# Patient Record
Sex: MALE | Race: WHITE | NOT HISPANIC OR LATINO | Employment: FULL TIME | URBAN - METROPOLITAN AREA
[De-identification: names, ages, dates, MRNs, and addresses within clinical notes are randomized per-mention and may not be internally consistent; named-entity substitution may affect disease eponyms.]

---

## 2017-04-20 ENCOUNTER — HOSPITAL ENCOUNTER (EMERGENCY)
Facility: CLINIC | Age: 52
Discharge: HOME | End: 2017-04-20
Attending: EMERGENCY MEDICINE | Admitting: EMERGENCY MEDICINE
Payer: COMMERCIAL

## 2017-04-20 VITALS
HEIGHT: 70 IN | OXYGEN SATURATION: 96 % | DIASTOLIC BLOOD PRESSURE: 98 MMHG | HEART RATE: 90 BPM | RESPIRATION RATE: 18 BRPM | SYSTOLIC BLOOD PRESSURE: 166 MMHG | BODY MASS INDEX: 26.48 KG/M2 | WEIGHT: 185 LBS | TEMPERATURE: 98.6 F

## 2017-04-20 DIAGNOSIS — J03.90 ACUTE TONSILLITIS, UNSPECIFIED ETIOLOGY: ICD-10-CM

## 2017-04-20 DIAGNOSIS — I10 BENIGN ESSENTIAL HYPERTENSION: ICD-10-CM

## 2017-04-20 LAB
DEPRECATED S PYO AG THROAT QL EIA: NORMAL
MICRO REPORT STATUS: NORMAL
SPECIMEN SOURCE: NORMAL

## 2017-04-20 PROCEDURE — 25000132 ZZH RX MED GY IP 250 OP 250 PS 637: Performed by: EMERGENCY MEDICINE

## 2017-04-20 PROCEDURE — 99283 EMERGENCY DEPT VISIT LOW MDM: CPT

## 2017-04-20 PROCEDURE — 87081 CULTURE SCREEN ONLY: CPT | Performed by: EMERGENCY MEDICINE

## 2017-04-20 PROCEDURE — 87880 STREP A ASSAY W/OPTIC: CPT | Performed by: EMERGENCY MEDICINE

## 2017-04-20 RX ORDER — CLINDAMYCIN HCL 300 MG
300 CAPSULE ORAL 4 TIMES DAILY
Qty: 28 CAPSULE | Refills: 0 | Status: SHIPPED | OUTPATIENT
Start: 2017-04-20 | End: 2017-04-27

## 2017-04-20 RX ORDER — ACETAMINOPHEN 325 MG/1
650 TABLET ORAL ONCE
Status: COMPLETED | OUTPATIENT
Start: 2017-04-20 | End: 2017-04-20

## 2017-04-20 RX ORDER — HYDROCODONE BITARTRATE AND ACETAMINOPHEN 5; 325 MG/1; MG/1
1-2 TABLET ORAL EVERY 4 HOURS PRN
Qty: 10 TABLET | Refills: 0 | Status: SHIPPED | OUTPATIENT
Start: 2017-04-20 | End: 2017-04-30

## 2017-04-20 RX ADMIN — ACETAMINOPHEN 650 MG: 325 TABLET, FILM COATED ORAL at 02:02

## 2017-04-20 NOTE — DISCHARGE INSTRUCTIONS
Take the Vicodin/Norco or Tylenol/Acetaminophen, but do not take both at the same time, because they both contain Acetaminophen.  You cannot take more than 4000 mg of Acetaminophen/Tylenol over a 24 hour period of time.    Drink plenty of fluids.    Opioid Medication Information    You have been given a prescription for an opioid (narcotic) pain medicine and/or have received a pain medicine while here in the Emergency Department. These medicines can make you drowsy or impaired. You must not drive, operate dangerous equipment, or engage in any other dangerous activities while taking these medications. If you drive while taking these medications, you could be arrested for DUI, or driving under the influence. Do not drink any alcohol while you are taking these medications.   Opioid pain medications can cause addiction. If you have a history of chemical dependency of any type, you are at a higher risk of becoming addicted to pain medications.  Only take these prescribed medications to treat your pain when all other options have been tried. Take it for as short a time and as few doses as possible. Store your pain pills in a secure place, as they are frequently stolen and provide a dangerous opportunity for children or visitors in your house to start abusing these powerful medications. We will not replace any lost or stolen medicine.  As soon as your pain is better, you should flush all your remaining medication.   Many prescription pain medications contain Tylenol  (acetaminophen), including Vicodin , Tylenol #3 , Norco , Lortab , and Percocet .  You should not take any extra pills of Tylenol  if you are using these prescription medications or you can get very sick.  Do not ever take more than 4000 mg of acetaminophen in any 24 hour period.  All opioids tend to cause constipation. Drink plenty of water and eat foods that have a lot of fiber, such as fruits, vegetables, prune juice, apple juice and high fiber cereal.   Take a laxative if you don t move your bowels at least every other day. Miralax , Milk of Magnesia, Colace , or Senna  can be used to keep you regular.

## 2017-04-20 NOTE — ED PROVIDER NOTES
CHIEF COMPLAINT:  Saran Agudelo is a 51-year-old male who arrives with a chief complaint of sore throat.      HISTORY OF PRESENT ILLNESS:  The patient reports that 5 days ago he started with nasal congestion and cough and feeling feverish.  The cough and fever resolved 3 days ago; however, 3 days ago he also began with a sore throat.  The sore throat seemed improved yesterday but then worsened over the past 24 hours and he states that the sore throat is worse than it was when it started 3 days ago.  He has painful swallowing, but he is able to swallow.  He is able to speak normally.  He has no headache, neck pain or stiffness.  No chest pain or difficulty breathing.  He has no known ill exposures.  He denies chest pain, difficulty breathing or headache or vomiting or vision changes.      PAST MEDICAL HISTORY:  DVT and PE, chronic kidney disease, hypertension, hyperlipidemia.      MEDICATIONS:  He takes aspirin 81 mg per day.  He was previously on antihypertensive medication but stopped taking it because he does not want to take any antihypertensives.      ALLERGIES:  Penicillin, droperidol, Compazine and bees.      REVIEW OF SYSTEMS:  See history of present illness.  Remainder of review of systems are negative.      SOCIAL HISTORY:  He smokes cigarettes on a daily basis.  He drinks occasional alcohol and denies drug use.  He works at the [employer].      PHYSICAL EXAMINATION:   VITAL SIGNS:  Blood pressure 169/95, pulse 90, respiratory rate 16, temperature 98.6 orally, SaO2 98% on room air.   GENERAL:  The patient is awake and alert.  He appears comfortable, in no acute distress.  He is swallowing and speaking normally.   HEENT:  PERRLA, nonicteric.  TMs clear.  Orals:  Erythema to the posterior oropharynx with a small amount of tonsillar exudate on the left tonsil.  There is no unilateral swelling and no peritonsillar fullness or swelling.     NECK:  Supple, nontender, no lymphadenopathy, no meningeal signs.  He  can touch his chin to his chest comfortably.   CHEST:  Clear to auscultation, equal.   CARDIOVASCULAR:  Regular rate and rhythm without murmur, rubs or gallops.   ABDOMEN:  Soft, round, nontender.   NEUROLOGIC:  Cranial nerves II-XII intact.      LABS AND DIAGNOSTIC TESTING:  Rapid Strep was performed and was negative.      EMERGENCY DEPARTMENT COURSE AND TREATMENT:  He was given Tylenol 650 mg p.o. with improvement of his pain.         IMPRESSION/MEDICAL DECISION MAKING:  I found the patient to have tonsillitis and I was concerned for strep tonsillitis and so he was discharged with clindamycin 300 mg 4 times daily for 7 days and a prescription for Vicodin and told not to drive a car or drink alcohol for 6 hours after taking the Vicodin.  He was told not to use ibuprofen because of his chronic kidney disease and to either use Tylenol or Vicodin but not both at the same time because they both have Tylenol in them.  He was given a work note for 2 days off of work and instructed to drink plenty of fluids and follow up in clinic in two days if his sore throat is not improved.  He was also told to follow up within the next week with his primary care doctor to recheck his blood pressure and get started on an antihypertensive.  I discussed with him the risks of worsening his kidney disease if he leaves his high blood pressure and treated and I discussed with him his hypertension here.  He understands and agrees.  He was not having any stroke or cardiac symptoms and I felt he could be safely discharged.      FINAL DIAGNOSIS:     1.  Acute tonsillitis, unspecified etiology.   2.  Benign essential hypertension.         AHMET SANTIAGO MD             D: 2017 06:02   T: 2017 07:46   MT:       Name:     ELLY FRANK   MRN:      [id number]        Account:      [id number]   :      1965           Visit Date:   2017      Document: [id number]

## 2017-04-20 NOTE — ED AVS SNAPSHOT
Emergency Department    [address] [place] [address]    Phone:  [phone]    Fax:  [phone]                                       Saran Agudelo   MRN: [id number]    Department:   Emergency Department   Date of Visit:  4/20/2017           After Visit Summary Signature Page     I have received my discharge instructions, and my questions have been answered. I have discussed any challenges I see with this plan with the nurse or doctor.    ..........................................................................................................................................  Patient/Patient Representative Signature      ..........................................................................................................................................  Patient Representative Print Name and Relationship to Patient    ..................................................               ................................................  Date                                            Time    ..........................................................................................................................................  Reviewed by Signature/Title    ...................................................              ..............................................  Date                                                            Time

## 2017-04-20 NOTE — LETTER
EMERGENCY DEPARTMENT  [address] [facility] [address]  [phone]    2017    Saran Agudelo  [address]   [place] [address]  [phone] (home) [phone] (work)    : 1965      To Whom it may concern:    Saran Agudelo was seen in our Emergency Department today, 2017.  I expect his condition to improve over the next 2 days.  He may return to work/school when improved.  Please excuse him from work today (and tomorrow if he requires it).    Sincerely,        Dolores Lopez

## 2017-04-20 NOTE — ED NOTES
"The Pt presents to the ED with c/o \"cold symptoms\" over the last five days. He reports that on 4/17/17 he states that his sore throat began painful lately. He denies  SOB or abdominal pain.  "

## 2017-04-20 NOTE — ED AVS SNAPSHOT
Emergency Department    [address] [facility] [address]    Phone:  [phone]    Fax:  [phone]                                       Saran Agudelo   MRN: [id number]    Department:   Emergency Department   Date of Visit:  4/20/2017           Patient Information     Date Of Birth          1965        Your diagnoses for this visit were:     Acute tonsillitis, unspecified etiology     Benign essential hypertension        You were seen by Dolores Lopez MD.      Follow-up Information     Follow up with Dandre Gaitan MD.    Specialty:  Internal Medicine    Why:  for recheck of your blood pressure and follow-up within the next week    Contact information:    [facility]  [address] [facility] [address] [phone]          Follow up with  Emergency Department.    Specialty:  EMERGENCY MEDICINE    Why:  As needed, If symptoms worsen    Contact information:    [address] [facility] [address] [phone]        Discharge Instructions       Take the Vicodin/Norco or Tylenol/Acetaminophen, but do not take both at the same time, because they both contain Acetaminophen.  You cannot take more than 4000 mg of Acetaminophen/Tylenol over a 24 hour period of time.    Drink plenty of fluids.    Opioid Medication Information    You have been given a prescription for an opioid (narcotic) pain medicine and/or have received a pain medicine while here in the Emergency Department. These medicines can make you drowsy or impaired. You must not drive, operate dangerous equipment, or engage in any other dangerous activities while taking these medications. If you drive while taking these medications, you could be arrested for DUI, or driving under the influence. Do not drink any alcohol while you are taking these medications.   Opioid pain medications can cause addiction. If you have a history of chemical dependency of any type, you are at a higher risk  of becoming addicted to pain medications.  Only take these prescribed medications to treat your pain when all other options have been tried. Take it for as short a time and as few doses as possible. Store your pain pills in a secure place, as they are frequently stolen and provide a dangerous opportunity for children or visitors in your house to start abusing these powerful medications. We will not replace any lost or stolen medicine.  As soon as your pain is better, you should flush all your remaining medication.   Many prescription pain medications contain Tylenol  (acetaminophen), including Vicodin , Tylenol #3 , Norco , Lortab , and Percocet .  You should not take any extra pills of Tylenol  if you are using these prescription medications or you can get very sick.  Do not ever take more than 4000 mg of acetaminophen in any 24 hour period.  All opioids tend to cause constipation. Drink plenty of water and eat foods that have a lot of fiber, such as fruits, vegetables, prune juice, apple juice and high fiber cereal.  Take a laxative if you don t move your bowels at least every other day. Miralax , Milk of Magnesia, Colace , or Senna  can be used to keep you regular.            Discharge References/Attachments     HIGH BLOOD PRESSURE, ESTABLISHED, OUT OF CONTROL (ENGLISH)    PHARYNGITIS, STREP (PRESUMED) (ENGLISH)      24 Hour Appointment Hotline       To make an appointment at any [facility] clinic, call [phone] ([phone]). If you don't have a family doctor or clinic, we will help you find one. [facility] clinics are conveniently located to serve the needs of you and your family.             Review of your medicines      START taking        Dose / Directions Last dose taken    clindamycin 300 MG capsule   Commonly known as:  CLEOCIN   Dose:  300 mg   Quantity:  28 capsule        Take 1 capsule (300 mg) by mouth 4 times daily for 7 days   Refills:  0        HYDROcodone-acetaminophen 5-325 MG per tablet    Commonly known as:  NORCO   Dose:  1-2 tablet   Quantity:  10 tablet        Take 1-2 tablets by mouth every 4 hours as needed for moderate to severe pain No driving a car or drinking alcohol for 6 hours after taking this medication.   Refills:  0          Our records show that you are taking the medicines listed below. If these are incorrect, please call your family doctor or clinic.        Dose / Directions Last dose taken    aspirin 81 MG chewable tablet   Dose:  81 mg   Quantity:  108 tablet        Take 1 tablet (81 mg) by mouth daily   Refills:  3                Prescriptions were sent or printed at these locations (2 Prescriptions)                   Other Prescriptions                Printed at Department/Unit printer (2 of 2)         clindamycin (CLEOCIN) 300 MG capsule               HYDROcodone-acetaminophen (NORCO) 5-325 MG per tablet                Procedures and tests performed during your visit     Beta strep group A culture    Rapid strep screen      Orders Needing Specimen Collection     None      Pending Results     Date and Time Order Name Status Description    4/20/2017 0153 Beta strep group A culture In process             Pending Culture Results     Date and Time Order Name Status Description    4/20/2017 0153 Beta strep group A culture In process             Test Results From Your Hospital Stay        4/20/2017  2:06 AM      Component Results     Component    Specimen Description    Throat    Rapid Strep A Screen    NEGATIVE: No Group A streptococcal antigen detected by immunoassay, await   culture report.      Micro Report Status    FINAL 04/20/2017 4/20/2017  2:07 AM                Clinical Quality Measure: Blood Pressure Screening     Your blood pressure was checked while you were in the emergency department today. The last reading we obtained was  BP: (!) 154/102 . Please read the guidelines below about what these numbers mean and what you should do about them.  If your systolic  "blood pressure (the top number) is less than 120 and your diastolic blood pressure (the bottom number) is less than 80, then your blood pressure is normal. There is nothing more that you need to do about it.  If your systolic blood pressure (the top number) is 120-139 or your diastolic blood pressure (the bottom number) is 80-89, your blood pressure may be higher than it should be. You should have your blood pressure rechecked within a year by a primary care provider.  If your systolic blood pressure (the top number) is 140 or greater or your diastolic blood pressure (the bottom number) is 90 or greater, you may have high blood pressure. High blood pressure is treatable, but if left untreated over time it can put you at risk for heart attack, stroke, or kidney failure. You should have your blood pressure rechecked by a primary care provider within the next 4 weeks.  If your provider in the emergency department today gave you specific instructions to follow-up with your doctor or provider even sooner than that, you should follow that instruction and not wait for up to 4 weeks for your follow-up visit.        Thank you for choosing [facility]       Thank you for choosing [facility] for your care. Our goal is always to provide you with excellent care. Hearing back from our patients is one way we can continue to improve our services. Please take a few minutes to complete the written survey that you may receive in the mail after you visit with us. Thank you!        [employer] Information     [employer] lets you send messages to your doctor, view your test results, renew your prescriptions, schedule appointments and more. To sign up, go to [email or web address] . Click on \"Log in\" on the left side of the screen, which will take you to the Welcome page. Then click on \"Sign up Now\" on the right side of the page.     You will be asked to enter the access code listed below, as well as some personal information. Please follow the " directions to create your username and password.     Your access code is: [email or web address]  Expires: 2017  2:20 AM     Your access code will  in 90 days. If you need help or a new code, please call your [facility] clinic or [phone].        Care EveryWhere ID     This is your Care EveryWhere ID. This could be used by other organizations to access your [facility] medical records  [id number]        After Visit Summary       This is your record. Keep this with you and show to your community pharmacist(s) and doctor(s) at your next visit.

## 2017-04-22 LAB
BACTERIA SPEC CULT: NORMAL
MICRO REPORT STATUS: NORMAL
SPECIMEN SOURCE: NORMAL

## 2017-05-13 ENCOUNTER — TELEPHONE (OUTPATIENT)
Dept: NURSING | Facility: CLINIC | Age: 52
End: 2017-05-13

## 2017-05-13 NOTE — TELEPHONE ENCOUNTER
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  It bled and  he washed it but is wondering when was hepatitis shot last given?  Advised tetanus is up to date but screening for hepatitus and  immunization for hepatitis would need an appointment. Advised him to  contact workman's comp with the airport clinic to notify them of  injury. Connected with scheduling for an appointment for testing.  Triage Note:  Guideline Title: No Guideline - Advice Per Reference (Adult)  Recommended Disposition: Call Local Agency within 24 Hours  Original Inclination: Did not know what to do  Override Disposition:  Intended Action: Follow advice given  Physician Contacted: No  CALL LOCAL AGENCY WITHIN 24 HOURS ?  YES  SEE ED IMMEDIATELY ? NO  CALL POISON CENTER IMMEDIATELY ? NO  CALL LOCAL AGENCY IMMEDIATELY ? NO  SEE DENTIST WITHIN 4 HOURS ? NO  SEE DENTIST WITHIN 24 HOURS ? NO  ACTIVATE  ? NO  SEE PROVIDER WITHIN 4 HOURS ? NO  SEE PROVIDER WITHIN 24 HOURS ? NO  CALL PROVIDER WITHIN 24 HOURS ? NO  CALL PROVIDER IMMEDIATELY ? NO  Physician Instructions:  Care Advice:  "

## 2017-05-15 ENCOUNTER — TELEPHONE (OUTPATIENT)
Dept: INTERNAL MEDICINE | Facility: CLINIC | Age: 52
End: 2017-05-15

## 2017-05-15 NOTE — TELEPHONE ENCOUNTER
Reason for Call: Request for an order or referral:    Order or referral being requested: Lab Test (Hepatitis)    Date needed: as soon as possible    Has the patient been seen by the PCP for this problem? unknown    Additional comments: the patient had cut himself at work on 05.13.17 and would like to get a Hepatitis check.  He was working on the human waste lines, pulling on the lines, and cut himself.      Phone number Patient can be reached at:  Home number on file [phone] (home)    Best Time:  anytime    Can we leave a detailed message on this number?  YES    Call taken on 5/15/2017 at 6:07 PM by Barbra Perrin

## 2017-05-16 NOTE — TELEPHONE ENCOUNTER
Pt states this is a work comp issue. Given number to schedule at occupational health clinic.  Cristy Lyons, SERGIO

## 2017-12-04 ENCOUNTER — TELEPHONE (OUTPATIENT)
Dept: ONCOLOGY | Facility: CLINIC | Age: 52
End: 2017-12-04

## 2017-12-04 NOTE — TELEPHONE ENCOUNTER
I received a message from patient concerned about a work injury that was sustained on his blood clot leg.   Patient has an appointment scheduled with Dr. Fox tomorrow 12/5. Bharti Ndiaye    I spoke with patient and he fell back on his leg and sustained a golf ball sized knot on the back of his leg that had previously had a clot. He has iced it some tthe area is tender but not bruised. He is only on an 81 mg of ASA. I informed him that we would see him tomorrow at his scheduled appointment. Bharti Ndiaye

## 2017-12-05 ENCOUNTER — HOSPITAL ENCOUNTER (OUTPATIENT)
Dept: ULTRASOUND IMAGING | Facility: CLINIC | Age: 52
Discharge: HOME | End: 2017-12-05
Attending: INTERNAL MEDICINE | Admitting: INTERNAL MEDICINE
Payer: OTHER MISCELLANEOUS

## 2017-12-05 ENCOUNTER — ONCOLOGY VISIT (OUTPATIENT)
Dept: ONCOLOGY | Facility: CLINIC | Age: 52
End: 2017-12-05
Attending: INTERNAL MEDICINE
Payer: OTHER MISCELLANEOUS

## 2017-12-05 VITALS
DIASTOLIC BLOOD PRESSURE: 102 MMHG | SYSTOLIC BLOOD PRESSURE: 160 MMHG | BODY MASS INDEX: 28.41 KG/M2 | WEIGHT: 198 LBS | HEART RATE: 83 BPM | RESPIRATION RATE: 22 BRPM | TEMPERATURE: 98.8 F

## 2017-12-05 DIAGNOSIS — M79.604 PAIN OF RIGHT LOWER EXTREMITY: Primary | ICD-10-CM

## 2017-12-05 PROCEDURE — 99213 OFFICE O/P EST LOW 20 MIN: CPT | Performed by: INTERNAL MEDICINE

## 2017-12-05 PROCEDURE — 99211 OFF/OP EST MAY X REQ PHY/QHP: CPT

## 2017-12-05 PROCEDURE — 93971 EXTREMITY STUDY: CPT | Mod: RT

## 2017-12-05 ASSESSMENT — PAIN SCALES - GENERAL: PAINLEVEL: NO PAIN (0)

## 2017-12-05 NOTE — MR AVS SNAPSHOT
"              After Visit Summary   2017    Saran Agudelo    MRN: [id number]           Patient Information     Date Of Birth          1965        Visit Information        Provider Department      2017 10:30 AM Syeda Fox MD [facility]        Today's Diagnoses     Pain of right lower extremity    -  1      Care Instructions    Right leg US today.  Follow up as needed.          Follow-ups after your visit        Who to contact     If you have questions or need follow up information about today's clinic visit or your schedule please contact [facility] directly at [phone].  Normal or non-critical lab and imaging results will be communicated to you by MailTimehart, letter or phone within 4 business days after the clinic has received the results. If you do not hear from us within 7 days, please contact the clinic through MailTimehart or phone. If you have a critical or abnormal lab result, we will notify you by phone as soon as possible.  Submit refill requests through [employer] or call your pharmacy and they will forward the refill request to us. Please allow 3 business days for your refill to be completed.          Additional Information About Your Visit        MyChart Information     [employer] lets you send messages to your doctor, view your test results, renew your prescriptions, schedule appointments and more. To sign up, go to [email or web address] . Click on \"Log in\" on the left side of the screen, which will take you to the Welcome page. Then click on \"Sign up Now\" on the right side of the page.     You will be asked to enter the access code listed below, as well as some personal information. Please follow the directions to create your username and password.     Your access code is: [email or web address]  Expires: 3/11/2018  8:55 AM     Your access code will  in 90 days. If you need help or a new code, please call your [facility] clinic or [phone].        Care " EveryWhere ID     This is your Care EveryWhere ID. This could be used by other organizations to access your [facility] medical records  [id number]        Your Vitals Were     Pulse Temperature Respirations BMI (Body Mass Index)          83 98.8  F (37.1  C) 22 28.41 kg/m2         Blood Pressure from Last 3 Encounters:   12/05/17 (!) 160/102   04/20/17 (!) 166/98   04/06/16 128/72    Weight from Last 3 Encounters:   12/05/17 89.8 kg (198 lb)   04/20/17 83.9 kg (185 lb)   04/06/16 83.9 kg (185 lb)              We Performed the Following     US Lower Extremity Venous Duplex Right        Primary Care Provider Office Phone # Fax #    Dandre Gaitan [phone]       [address] [facility] [address]        Equal Access to Services     [facility]: Hadii aad ku hadasho Soomaali, waaxda luqadaha, qaybta kaalmada adeegyada, ren miguel hayinocente hoskins . So [facility] [phone].    ATENCIÓN: Si habla español, tiene a zuniga disposición servicios gratuitos de asistencia lingüística. Lan al [phone].    We comply with applicable federal civil rights laws and [place] laws. We do not discriminate on the basis of race, color, national origin, age, disability, sex, sexual orientation, or gender identity.            Thank you!     Thank you for choosing [facility]  for your care. Our goal is always to provide you with excellent care. Hearing back from our patients is one way we can continue to improve our services. Please take a few minutes to complete the written survey that you may receive in the mail after your visit with us. Thank you!             Your Updated Medication List - Protect others around you: Learn how to safely use, store and throw away your medicines at www.disposemymeds.org.          This list is accurate as of: 12/5/17 11:59 PM.  Always use your most recent med list.                   Brand Name Dispense Instructions for use Diagnosis    aspirin 81 MG  chewable tablet     108 tablet    Take 1 tablet (81 mg) by mouth daily

## 2017-12-05 NOTE — LETTER
"    12/5/2017         RE: Saran Agudelo  [address]   [facility] [address]        Dear Colleague,    Thank you for referring your patient, Saran Agudelo, to the [facility]. Please see a copy of my visit note below.    Oncology Rooming Note    December 5, 2017 10:18 AM   Saran Agudelo is a 52 year old male who presents for:    Chief Complaint   Patient presents with     Oncology Clinic Visit     Return-leg injury      Initial Vitals: BP (!) 160/102 (BP Location: Right arm, Patient Position: Chair, Cuff Size: Adult Large)  Pulse 83  Temp 98.8  F (37.1  C)  Resp 22  Wt 89.8 kg (198 lb)  BMI 28.41 kg/m2 Estimated body mass index is 28.41 kg/(m^2) as calculated from the following:    Height as of 4/20/17: 1.778 m (5' 10\").    Weight as of this encounter: 89.8 kg (198 lb). Body surface area is 2.11 meters squared.  No Pain (0) Comment: Data Unavailable   No LMP for male patient.  Allergies reviewed: Yes  Medications reviewed: Yes    Medications: Pt dose not need refill on medication  Pharmacy name entered into [facility]: [facility], [place] - [address]    Clinical concerns: none     5 minutes for nursing intake (face to face time)     Shonna Mchugh CMA        Right leg US today.  Follow up as needed.        HEMATOLOGIC HISTORY: Mr. Saran Agudelo is a gentleman with deep vein thrombosis and pulmonary embolism.  1. Right lower extremity ultrasound was on 04/03/2014 revealed an occlusive DVT involving the mid to distal femoral vein extending into popliteal and tibioperoneal trunk. DVT was unprovoked. Started on treatment with enoxaparin and warfarin.   2. Because of hemoptysis, a CT chest was done on 04/05/2014. It revealed pulmonary embolism involving the right upper lobe, right middle lobe, right lower lobe and left upper lobe. There was suspicion of a small pulmonary infarct in the right middle lobe.   3. Hypercoagulable workup was done on " 04/05/2014.   -Lupus anticoagulant negative.   -Anticardiolipin IgG and IgM negative.   -Factor II negative.   -Factor V Leiden negative.   4. CT abdomen and pelvis on 05/05/2014 did not reveal any evidence of malignancy. There was question of a 0.7 cm filling defect in terminal ileum.   5. Colonoscopy on 01/06/2015 revealed a sigmoid colon polyp which was tubular adenoma.  Repeat colonoscopy in 5 years has been recommended.   6. CT chest angiogram on 06/08/2015 did not reveal any PE.  7. Anticoagulation was stopped in 06/2015. (Patient did not want long term anti-coagulation).    SUBJECTIVE:    Mr. Saran Agudelo is a 52-year-old gentleman with history of unprovoked right lower extremity DVT and bilateral pulmonary embolism in 04/2014.  Patient received anticoagulation between 04/2014 and 06/2015.  He was recommended indefinite anticoagulation, but he did not want it. Anticoagulation was stopped in 06/2015.  He is currently on aspirin.  He has done well.  There has been no recurrence of blood clot.      Patient presents to the clinic because of pain and swelling in the right leg which started after an injury.  On 11/30/2017, he fell down while at work.  Patient injured his right leg.  After the fall, he started to have pain and swelling. Pain and swelling is mainly in the right calf area.  He has continued to work.      REVIEW OF SYSTEMS:  Denies any headache.  No dizziness.  No neck pain.  No chest pain or difficulty breathing.  No abdominal pain, nausea or vomiting.  No urinary or bowel complaints.  No bleeding.  No fever, chills, night sweats.      PHYSICAL EXAMINATION:   GENERAL:  He is alert and oriented x3.   VITAL SIGNS:  Reviewed.   EYES:  No icterus.   THROAT:  No ulcer or thrush.   NECK:  Supple, no lymphadenopathy or thyromegaly.   AXILLAE:  No lymphadenopathy.   LUNGS:  Good air entry bilaterally.  No crackles or wheezing.   HEART:  Regular.   ABDOMEN:  Soft and nontender.  No mass.   EXTREMITIES:      -Right lower extremity does not reveal any edema.  There is some calff swelling and tenderness.  No redness.  Skin is normal.  No breakdown of the skin.  Right thigh is normal.  No swelling, redness or tenderness.  Right knee is normal.   -Left lower extremity is normal.  No swelling, redness or tenderness.   SKIN:  No petechia.      ASSESSMENT:   1.  Pain and swelling in right calf.  This is secondary to injury on 11/30/2017.   2.  History of unprovoked right lower extremity DVT and bilateral pulmonary embolism in 04/2014.      PLAN:   1.  Discussed with him regarding right calf pain and swelling.  This started after injury at work.  I told him this is secondary to trauma.  Given his history of thrombosis, we need to rule out DVT.  I will get an ultrasound done.  He is agreeable for it.   2.  For pain and swelling, he will continue taking over-the-counter pain medication as needed.  Advised him to limit standing at work as tolerated.     3.  Advised the patient to go to the emergency room if he has chest pain, shortness of breath, worsening pain/swelling/redness in the extremities.      ADDENDUM:    Ultrasound of the right lower extremity does not reveal any acute DVT.  There is a small amount of nonocclusive chronic thrombus in the right popliteal vein.  It has improved compared to 04/2014.      I called and spoke to the patient. Informed him of the results of ultrasound.  I told him there is no acute DVT.  Previous blood clot has improved.  There is residual nonocclusive thrombosis.  I told the patient that pain and swelling is secondary to injury.  I advised him to take over-the-counter pain medication. Patient's work involves a lot of standing.  I told him to limit the amount of standing as tolerated.  He will continue on aspirin.  I advised the patient to return to clinic if his pain and swelling does not improve in the next 2-3 weeks or if it gets worse or if he has any other new problems.  Patient will  have his blood pressure rechecked.  It is high in the clinic because of pain.         PERLITA MENDOZA MD             D: 2017 15:17   T: 2017 06:30   MT: SK      Name:     ELLY FRANK   MRN:      [id number]        Account:      [id number]   :      1965           Visit Date:   2017      Document: [id number]        Again, thank you for allowing me to participate in the care of your patient.        Sincerely,        Perlita Mendoza MD

## 2017-12-05 NOTE — PROGRESS NOTES
"Oncology Rooming Note    December 5, 2017 10:18 AM   Saran Agudelo is a 52 year old male who presents for:    Chief Complaint   Patient presents with     Oncology Clinic Visit     Return-leg injury      Initial Vitals: BP (!) 160/102 (BP Location: Right arm, Patient Position: Chair, Cuff Size: Adult Large)  Pulse 83  Temp 98.8  F (37.1  C)  Resp 22  Wt 89.8 kg (198 lb)  BMI 28.41 kg/m2 Estimated body mass index is 28.41 kg/(m^2) as calculated from the following:    Height as of 4/20/17: 1.778 m (5' 10\").    Weight as of this encounter: 89.8 kg (198 lb). Body surface area is 2.11 meters squared.  No Pain (0) Comment: Data Unavailable   No LMP for male patient.  Allergies reviewed: Yes  Medications reviewed: Yes    Medications: Pt dose not need refill on medication  Pharmacy name entered into [employer]: [facility] - [id number]    Clinical concerns: none     5 minutes for nursing intake (face to face time)     Shonna Mchugh CMA        Right leg US today.  Follow up as needed.      "

## 2017-12-06 NOTE — PROGRESS NOTES
HEMATOLOGIC HISTORY: Mr. Saran Agudelo is a gentleman with deep vein thrombosis and pulmonary embolism.  1. Right lower extremity ultrasound was on 04/03/2014 revealed an occlusive DVT involving the mid to distal femoral vein extending into popliteal and tibioperoneal trunk. DVT was unprovoked. Started on treatment with enoxaparin and warfarin.   2. Because of hemoptysis, a CT chest was done on 04/05/2014. It revealed pulmonary embolism involving the right upper lobe, right middle lobe, right lower lobe and left upper lobe. There was suspicion of a small pulmonary infarct in the right middle lobe.   3. Hypercoagulable workup was done on 04/05/2014.   -Lupus anticoagulant negative.   -Anticardiolipin IgG and IgM negative.   -Factor II negative.   -Factor V Leiden negative.   4. CT abdomen and pelvis on 05/05/2014 did not reveal any evidence of malignancy. There was question of a 0.7 cm filling defect in terminal ileum.   5. Colonoscopy on 01/06/2015 revealed a sigmoid colon polyp which was tubular adenoma.  Repeat colonoscopy in 5 years has been recommended.   6. CT chest angiogram on 06/08/2015 did not reveal any PE.  7. Anticoagulation was stopped in 06/2015. (Patient did not want long term anti-coagulation).    SUBJECTIVE:    Mr. Saran Agudelo is a 52-year-old gentleman with history of unprovoked right lower extremity DVT and bilateral pulmonary embolism in 04/2014.  Patient received anticoagulation between 04/2014 and 06/2015.  He was recommended indefinite anticoagulation, but he did not want it. Anticoagulation was stopped in 06/2015.  He is currently on aspirin.  He has done well.  There has been no recurrence of blood clot.      Patient presents to the clinic because of pain and swelling in the right leg which started after an injury.  On 11/30/2017, he fell down while at work.  Patient injured his right leg.  After the fall, he started to have pain and swelling. Pain and swelling is mainly in the right  calf area.  He has continued to work.      REVIEW OF SYSTEMS:  Denies any headache.  No dizziness.  No neck pain.  No chest pain or difficulty breathing.  No abdominal pain, nausea or vomiting.  No urinary or bowel complaints.  No bleeding.  No fever, chills, night sweats.      PHYSICAL EXAMINATION:   GENERAL:  He is alert and oriented x3.   VITAL SIGNS:  Reviewed.   EYES:  No icterus.   THROAT:  No ulcer or thrush.   NECK:  Supple, no lymphadenopathy or thyromegaly.   AXILLAE:  No lymphadenopathy.   LUNGS:  Good air entry bilaterally.  No crackles or wheezing.   HEART:  Regular.   ABDOMEN:  Soft and nontender.  No mass.   EXTREMITIES:     -Right lower extremity does not reveal any edema.  There is some calff swelling and tenderness.  No redness.  Skin is normal.  No breakdown of the skin.  Right thigh is normal.  No swelling, redness or tenderness.  Right knee is normal.   -Left lower extremity is normal.  No swelling, redness or tenderness.   SKIN:  No petechia.      ASSESSMENT:   1.  Pain and swelling in right calf.  This is secondary to injury on 11/30/2017.   2.  History of unprovoked right lower extremity DVT and bilateral pulmonary embolism in 04/2014.      PLAN:   1.  Discussed with him regarding right calf pain and swelling.  This started after injury at work.  I told him this is secondary to trauma.  Given his history of thrombosis, we need to rule out DVT.  I will get an ultrasound done.  He is agreeable for it.   2.  For pain and swelling, he will continue taking over-the-counter pain medication as needed.  Advised him to limit standing at work as tolerated.     3.  Advised the patient to go to the emergency room if he has chest pain, shortness of breath, worsening pain/swelling/redness in the extremities.      ADDENDUM:    Ultrasound of the right lower extremity does not reveal any acute DVT.  There is a small amount of nonocclusive chronic thrombus in the right popliteal vein.  It has improved compared  to 2014.      I called and spoke to the patient. Informed him of the results of ultrasound.  I told him there is no acute DVT.  Previous blood clot has improved.  There is residual nonocclusive thrombosis.  I told the patient that pain and swelling is secondary to injury.  I advised him to take over-the-counter pain medication. Patient's work involves a lot of standing.  I told him to limit the amount of standing as tolerated.  He will continue on aspirin.  I advised the patient to return to clinic if his pain and swelling does not improve in the next 2-3 weeks or if it gets worse or if he has any other new problems.  Patient will have his blood pressure rechecked.  It is high in the clinic because of pain.         PERLITA MENDOZA MD             D: 2017 15:17   T: 2017 06:30   MT: SK      Name:     ELLY FRANK   MRN:      [id number]        Account:      [id number]   :      1965           Visit Date:   2017      Document: [id number]

## 2017-12-11 ENCOUNTER — TELEPHONE (OUTPATIENT)
Dept: ONCOLOGY | Facility: CLINIC | Age: 52
End: 2017-12-11

## 2017-12-11 NOTE — TELEPHONE ENCOUNTER
I called and spoke with patient regarding high blood pressure noted at last week's office visit with Dr. Fox. Patient states he has an appointment with is primary to have them check and advise on blood pressure management.     Currently denies any symptoms of high blood pressure such as headache, blurred vision or chest pain. But informed me over a month ago he has had a few episodes of blurred vision with headache and has been under a lot of stress Bharti Ndiaye

## 2020-02-11 ENCOUNTER — HOSPITAL ENCOUNTER (EMERGENCY)
Facility: CLINIC | Age: 55
Discharge: HOME | End: 2020-02-11
Attending: EMERGENCY MEDICINE | Admitting: EMERGENCY MEDICINE
Payer: COMMERCIAL

## 2020-02-11 VITALS
RESPIRATION RATE: 16 BRPM | OXYGEN SATURATION: 98 % | DIASTOLIC BLOOD PRESSURE: 95 MMHG | BODY MASS INDEX: 30.06 KG/M2 | HEART RATE: 88 BPM | TEMPERATURE: 98.3 F | HEIGHT: 70 IN | SYSTOLIC BLOOD PRESSURE: 163 MMHG | WEIGHT: 210 LBS

## 2020-02-11 DIAGNOSIS — J10.1 INFLUENZA A: ICD-10-CM

## 2020-02-11 LAB
FLUAV+FLUBV AG SPEC QL: NEGATIVE
FLUAV+FLUBV AG SPEC QL: POSITIVE
SPECIMEN SOURCE: ABNORMAL

## 2020-02-11 PROCEDURE — 99283 EMERGENCY DEPT VISIT LOW MDM: CPT

## 2020-02-11 PROCEDURE — 25000132 ZZH RX MED GY IP 250 OP 250 PS 637: Performed by: EMERGENCY MEDICINE

## 2020-02-11 PROCEDURE — 87804 INFLUENZA ASSAY W/OPTIC: CPT | Performed by: EMERGENCY MEDICINE

## 2020-02-11 RX ORDER — ACETAMINOPHEN 325 MG/1
650 TABLET ORAL ONCE
Status: COMPLETED | OUTPATIENT
Start: 2020-02-11 | End: 2020-02-11

## 2020-02-11 RX ORDER — OSELTAMIVIR PHOSPHATE 75 MG/1
75 CAPSULE ORAL 2 TIMES DAILY
Qty: 10 CAPSULE | Refills: 0 | Status: SHIPPED | OUTPATIENT
Start: 2020-02-11 | End: 2020-02-16

## 2020-02-11 RX ADMIN — ACETAMINOPHEN 650 MG: 325 TABLET, FILM COATED ORAL at 09:30

## 2020-02-11 ASSESSMENT — MIFFLIN-ST. JEOR: SCORE: 1798.8

## 2020-02-11 ASSESSMENT — ENCOUNTER SYMPTOMS
FATIGUE: 1
FEVER: 1
MYALGIAS: 1
HEADACHES: 1
COUGH: 1
CHILLS: 1

## 2020-02-11 NOTE — ED AVS SNAPSHOT
Emergency Department  [address] [place] [address]  Phone:  [phone]  Fax:  [phone]                                    Saran Agudelo   MRN: [id number]    Department:   Emergency Department   Date of Visit:  2/11/2020           After Visit Summary Signature Page    I have received my discharge instructions, and my questions have been answered. I have discussed any challenges I see with this plan with the nurse or doctor.    ..........................................................................................................................................  Patient/Patient Representative Signature      ..........................................................................................................................................  Patient Representative Print Name and Relationship to Patient    ..................................................               ................................................  Date                                   Time    ..........................................................................................................................................  Reviewed by Signature/Title    ...................................................              ..............................................  Date                                               Time          22EPIC Rev 08/18

## 2020-02-11 NOTE — ED TRIAGE NOTES
Fever, chills, decreased, cough, body aches, and states his penis was purple this morning. States his kidney function is at 50%.

## 2020-02-11 NOTE — ED PROVIDER NOTES
"  History     Chief Complaint:  Flu Symptoms     HPI   Saran Agudelo is a 54 year old male with a history of CKD, hyperlipidemia, PE/DVT, among others, who presents with flu symptoms. The patient reports that 3 nights ago he started to develop a dry, nonproductive cough when he was out shopping and by the time he returned home he had felt feverish with chills, body aches, fatigue, and headaches. The patient notes that when he coughs his head will hurt more. He took a baby aspirin yesterday at home but has not had any Tylenol or ibuprofen today. The patient has not received the flu vaccine this year. The patient had a ill exposure to a coworker recently.     Allergies:  Bees  Compazine   Droperidol   Penicillins      Medications:    Aspirin     Past Medical History:    Hyperlipidemia  nephrolithiasis   Bursal cyst  CKD  Pulmonary Emboli   Enlarged lymph nodes   DVT     Past Surgical History:    Past surgical history reviewed. No pertinent past surgical history.     Family History:    Father: Coronary Artery Disease, hypertension     Social History:  The patient was unaccompanied to the ED.  Smoking Status: Former Smoker  Smokeless Tobacco: Never Used  Alcohol Use: Positive  Drug Use: Negative  PCP: Dandre Gaitan   Marital Status:  Single     Review of Systems   Constitutional: Positive for chills, fatigue and fever.   Respiratory: Positive for cough.    Musculoskeletal: Positive for myalgias.   Neurological: Positive for headaches.   All other systems reviewed and are negative.    Physical Exam     Patient Vitals for the past 24 hrs:   BP Temp Temp src Pulse Heart Rate Resp SpO2 Height Weight   02/11/20 0819 (!) 163/95 98.3  F (36.8  C) Oral 88 88 16 98 % 1.778 m (5' 10\") 95.3 kg (210 lb)      Physical Exam    Physical Exam   General:  Sitting on bed, unaccompanied.   HENT:  No obvious trauma to head  Right Ear:  External ear normal.   Left Ear:  External ear normal.   Nose:  Nose normal.   Eyes:  Conjunctivae " and EOM are normal. Pupils are equal, round, and reactive.   Neck: Normal range of motion. Neck supple. No tracheal deviation present.   CV:  Normal heart sounds. No murmur heard.  Pulm/Chest: Effort normal and breath sounds normal.   Abd: Soft. No distension. There is no tenderness. There is no rigidity, no rebound and no guarding.   M/S: Normal range of motion.   Neuro: Alert. GCS 15.  Skin: Skin is warm and dry. No rash noted. Not diaphoretic.   Psych: Normal mood and affect. Behavior is normal.     Emergency Department Course     Laboratory:  Laboratory findings were communicated with the patient who voiced understanding of the findings.    Influenza A/B antigen: Influenza A positive (A), o/w WNL.     Interventions:   Tylenol 650 mg PO    Emergency Department Course:    0825 A nasal swab sample was obtained for laboratory testing as documented above.     0905 Nursing notes and vitals reviewed. I performed an exam of the patient as documented above.     0912 Prior to discharge, I personally reviewed the results with the patient and all related questions were answered. The patient verbalized understanding and is amenable to plan.     Impression & Plan      Medical Decision Making:  Saran Agudelo is a very pleasant 54 year old male who presents for evaluation of cough, fever and myalgias.  They have other symptoms including headache and fatigue.  This is consistent with influenza and nasal swab confirms this. TamiFlu is indicated due to patient's underlying renal insufficiency. They are at risk for pneumonia but no signs of this are detected on today's visit.  Close followup of primary care physician is indicated and return to the ED for high fevers > 103 for more than 48 hours more, increasing productive cough, shortness of breath, or confusion.  There is no signs of serious bacterial infection such as bacteremia, meningitis, UTI/pyelonephritis, strep pharyngitis, etc.      The treatment plan was discussed  with the patient and they expressed understanding of this plan and consented to the plan.  In addition, the patient will return to the emergency department if their symptoms persist, worsen, if new symptoms arise or if there is any concern as other pathology may be present that is not evident at this time. They also understand the importance of close follow up in the clinic and if unable to do so will return to the emergency department for a reevaluation. All questions were answered.    Diagnosis:    ICD-10-CM    1. Influenza A J10.1      Disposition:   The patient is discharged to home.     Discharge Medications:  New Prescriptions    OSELTAMIVIR (TAMIFLU) 75 MG CAPSULE    Take 1 capsule (75 mg) by mouth 2 times daily for 5 days     Scribe Disclosure:  I, Orla Severson, am serving as a scribe at 8:20 AM on 2/11/2020 to document services personally performed by Santiago Tejada DO based on my observations and the provider's statements to me.    EMERGENCY DEPARTMENT       Santiago Tejada DO  02/11/20 0924

## 2021-02-03 ENCOUNTER — TELEPHONE (OUTPATIENT)
Dept: INTERNAL MEDICINE | Facility: CLINIC | Age: 56
End: 2021-02-03
Payer: COMMERCIAL

## 2021-02-03 NOTE — TELEPHONE ENCOUNTER
Patient called the clinic wanting Dr. Gaitan's recommendation on whether he should get the COVID vaccine and vaccines effectiveness toward the strain going around. Pt's cousin recently  from COVID vaccine - sounds like he had an anaphylactic type of a reaction 3 hours after the shot. Advised that every vaccine has risks and benefits and that patient has the right to do research and decide if he wants to get it or not. Pt wanted Dr. Gaitan to personally called him to discuss this issue, but did not want to pay for a telephone visit at this time.

## 2021-11-09 NOTE — TELEPHONE ENCOUNTER
Pt was called, Dr. Gaitan did call patient in February and this issue was discussed. Pt does not have further questions or concerns.

## 2023-01-02 ENCOUNTER — NURSE TRIAGE (OUTPATIENT)
Dept: FAMILY MEDICINE | Facility: CLINIC | Age: 58
End: 2023-01-02

## 2023-01-02 NOTE — TELEPHONE ENCOUNTER
High priority call, discussed, last appointment was 2014 in [place], pt informs noticed lower arm/hand numbness x 2-3 weeks, last provider was Dr Bell in a different network, see triage note, discussed at length, pt also has concerns re: BP and eyes, pt not sure which health care system to use, works for [employer] as a , main concern is carpal tunnel symptoms,  pt will confirm where he needs to go and call back to establish care, pt also encouraged to make eye doctor exam appointment, pt agrees    (discuss with central scheduling that pt is not established, caller did not know how to send to [facility], assisted pt and provided options)     Reason for Disposition    Numbness or tingling on both sides of body and is a new symptom lasting > 24 hours    Additional Information    Negative: Difficult to awaken or acting confused (e.g., disoriented, slurred speech)    Negative: New neurologic deficit that is present NOW, sudden onset of ANY of the following: * Weakness of the face, arm, or leg on one side of the body* Numbness of the face, arm, or leg on one side of the body* Loss of speech or garbled speech    Negative: Sounds like a life-threatening emergency to the triager    Negative: Confusion, disorientation, or hallucinations is main symptom    Negative: Dizziness is main symptom    Negative: Followed a head injury within last 3 days    Negative: Headache (with neurologic deficit)    Negative: Unable to urinate (or only a few drops) and bladder feels very full    Negative: Loss of bladder or bowel control (urine or bowel incontinence; wetting self, leaking stool) of new-onset    Negative: Back pain with numbness (loss of sensation) in groin or rectal area    Negative: Neurologic deficit that was brief (now gone), ANY of the following: * Weakness of the face, arm, or leg on one side of the body * Numbness of the face, arm, or leg on one side of the body * Loss of speech or garbled speech    Negative:  "Patient sounds very sick or weak to the triager    Negative: Neurologic deficit of gradual onset (e.g., days to weeks), ANY of the following: * Weakness of the face, arm, or leg on one side of the body* Numbness of the face, arm, or leg on one side of the body* Loss of speech or garbled speech    Negative: [place] palsy suspected (i.e., weakness only one side of the face, developing over hours to days, no other symptoms)    Negative: Tingling (e.g., pins and needles) of the face, arm or leg on one side of the body, that is present now (Exceptions: Chronic or recurrent symptom lasting > 4 weeks; or tingling from known cause, such as: bumped elbow, carpal tunnel syndrome, pinched nerve, frostbite.)    Negative: Neck pain (with neurologic deficit)    Negative: Back pain (with neurologic deficit)    Negative: Patient wants to be seen    Negative: Loss of speech or garbled speech is a chronic symptom (recurrent or ongoing problem lasting > 4 weeks)    Negative: Weakness of arm or leg is a chronic symptom (recurrent or ongoing problem lasting > 4 weeks)    Negative: Numbness or tingling in one or both hands is a chronic symptom (recurrent or ongoing problem lasting > 4 weeks)    Negative: Numbness or tingling in one or both feet is a chronic symptom (recurrent or ongoing problem lasting > 4 weeks)    Answer Assessment - Initial Assessment Questions  1. SYMPTOM: \"What is the main symptom you are concerned about?\" (e.g., weakness, numbness)      Numbness from elbow to hands  2. ONSET: \"When did this start?\" (minutes, hours, days; while sleeping)      12/22/22   3. LAST NORMAL: \"When was the last time you (the patient) were normal (no symptoms)?\"      No symptoms before 12/22/22  4. PATTERN \"Does this come and go, or has it been constant since it started?\"  \"Is it present now?\"      Yes, worse after sleeping or holding phone  5. CARDIAC SYMPTOMS: \"Have you had any of the following symptoms: chest pain, difficulty breathing, " "palpitations?\"      No   6. NEUROLOGIC SYMPTOMS: \"Have you had any of the following symptoms: headache, dizziness, vision loss, double vision, changes in speech, unsteady on your feet?\"      No   7. OTHER SYMPTOMS: \"Do you have any other symptoms?\"      Once a twice a month was cross eyed vision for 2-3 months   8. PREGNANCY: \"Is there any chance you are pregnant?\" \"When was your last menstrual period?\"      n/a    Protocols used: NEUROLOGIC DEFICIT-A-[place]    Viviana Hall RN, BSN  [facility]      "

## 2023-01-07 ENCOUNTER — APPOINTMENT (OUTPATIENT)
Dept: MRI IMAGING | Facility: CLINIC | Age: 58
End: 2023-01-07
Attending: EMERGENCY MEDICINE
Payer: COMMERCIAL

## 2023-01-07 ENCOUNTER — APPOINTMENT (OUTPATIENT)
Dept: GENERAL RADIOLOGY | Facility: CLINIC | Age: 58
End: 2023-01-07
Attending: EMERGENCY MEDICINE
Payer: COMMERCIAL

## 2023-01-07 ENCOUNTER — HOSPITAL ENCOUNTER (EMERGENCY)
Facility: CLINIC | Age: 58
Discharge: HOME | End: 2023-01-07
Attending: EMERGENCY MEDICINE | Admitting: EMERGENCY MEDICINE
Payer: COMMERCIAL

## 2023-01-07 VITALS
DIASTOLIC BLOOD PRESSURE: 112 MMHG | OXYGEN SATURATION: 100 % | RESPIRATION RATE: 15 BRPM | WEIGHT: 200 LBS | SYSTOLIC BLOOD PRESSURE: 179 MMHG | TEMPERATURE: 97.2 F | HEIGHT: 70 IN | BODY MASS INDEX: 28.63 KG/M2 | HEART RATE: 69 BPM

## 2023-01-07 DIAGNOSIS — I10 BENIGN ESSENTIAL HYPERTENSION: ICD-10-CM

## 2023-01-07 DIAGNOSIS — R42 LIGHTHEADEDNESS: ICD-10-CM

## 2023-01-07 DIAGNOSIS — R20.0 NUMBNESS: ICD-10-CM

## 2023-01-07 LAB
ANION GAP SERPL CALCULATED.3IONS-SCNC: 9 MMOL/L (ref 3–14)
ATRIAL RATE - MUSE: 71 BPM
BASOPHILS # BLD AUTO: 0 10E3/UL (ref 0–0.2)
BASOPHILS NFR BLD AUTO: 0 %
BUN SERPL-MCNC: 24 MG/DL (ref 7–30)
CALCIUM SERPL-MCNC: 9.1 MG/DL (ref 8.5–10.1)
CHLORIDE BLD-SCNC: 102 MMOL/L (ref 94–109)
CO2 SERPL-SCNC: 28 MMOL/L (ref 20–32)
CREAT SERPL-MCNC: 1.38 MG/DL (ref 0.66–1.25)
DIASTOLIC BLOOD PRESSURE - MUSE: NORMAL MMHG
EOSINOPHIL # BLD AUTO: 0.1 10E3/UL (ref 0–0.7)
EOSINOPHIL NFR BLD AUTO: 1 %
ERYTHROCYTE [DISTWIDTH] IN BLOOD BY AUTOMATED COUNT: 12.5 % (ref 10–15)
GFR SERPL CREATININE-BSD FRML MDRD: 60 ML/MIN/1.73M2
GLUCOSE BLD-MCNC: 137 MG/DL (ref 70–99)
HCT VFR BLD AUTO: 45.3 % (ref 40–53)
HGB BLD-MCNC: 15.6 G/DL (ref 13.3–17.7)
HOLD SPECIMEN: NORMAL
IMM GRANULOCYTES # BLD: 0 10E3/UL
IMM GRANULOCYTES NFR BLD: 0 %
INTERPRETATION ECG - MUSE: NORMAL
LYMPHOCYTES # BLD AUTO: 1.7 10E3/UL (ref 0.8–5.3)
LYMPHOCYTES NFR BLD AUTO: 19 %
MCH RBC QN AUTO: 32.3 PG (ref 26.5–33)
MCHC RBC AUTO-ENTMCNC: 34.4 G/DL (ref 31.5–36.5)
MCV RBC AUTO: 94 FL (ref 78–100)
MONOCYTES # BLD AUTO: 0.8 10E3/UL (ref 0–1.3)
MONOCYTES NFR BLD AUTO: 8 %
NEUTROPHILS # BLD AUTO: 6.4 10E3/UL (ref 1.6–8.3)
NEUTROPHILS NFR BLD AUTO: 72 %
NRBC # BLD AUTO: 0 10E3/UL
NRBC BLD AUTO-RTO: 0 /100
P AXIS - MUSE: 52 DEGREES
PLATELET # BLD AUTO: 200 10E3/UL (ref 150–450)
POTASSIUM BLD-SCNC: 3.5 MMOL/L (ref 3.4–5.3)
PR INTERVAL - MUSE: 138 MS
QRS DURATION - MUSE: 90 MS
QT - MUSE: 412 MS
QTC - MUSE: 447 MS
R AXIS - MUSE: 34 DEGREES
RBC # BLD AUTO: 4.83 10E6/UL (ref 4.4–5.9)
SODIUM SERPL-SCNC: 139 MMOL/L (ref 133–144)
SYSTOLIC BLOOD PRESSURE - MUSE: NORMAL MMHG
T AXIS - MUSE: 30 DEGREES
TROPONIN I SERPL HS-MCNC: 7 NG/L
TROPONIN I SERPL HS-MCNC: 9 NG/L
VENTRICULAR RATE- MUSE: 71 BPM
WBC # BLD AUTO: 9.1 10E3/UL (ref 4–11)

## 2023-01-07 PROCEDURE — 255N000002 HC RX 255 OP 636: Performed by: EMERGENCY MEDICINE

## 2023-01-07 PROCEDURE — 93005 ELECTROCARDIOGRAM TRACING: CPT | Mod: RTG

## 2023-01-07 PROCEDURE — 99285 EMERGENCY DEPT VISIT HI MDM: CPT | Mod: 25

## 2023-01-07 PROCEDURE — A9585 GADOBUTROL INJECTION: HCPCS | Performed by: EMERGENCY MEDICINE

## 2023-01-07 PROCEDURE — 70549 MR ANGIOGRAPH NECK W/O&W/DYE: CPT

## 2023-01-07 PROCEDURE — 71046 X-RAY EXAM CHEST 2 VIEWS: CPT

## 2023-01-07 PROCEDURE — 70553 MRI BRAIN STEM W/O & W/DYE: CPT

## 2023-01-07 PROCEDURE — 36415 COLL VENOUS BLD VENIPUNCTURE: CPT | Performed by: EMERGENCY MEDICINE

## 2023-01-07 PROCEDURE — 84295 ASSAY OF SERUM SODIUM: CPT | Performed by: EMERGENCY MEDICINE

## 2023-01-07 PROCEDURE — 70544 MR ANGIOGRAPHY HEAD W/O DYE: CPT

## 2023-01-07 PROCEDURE — 84484 ASSAY OF TROPONIN QUANT: CPT | Mod: 91 | Performed by: EMERGENCY MEDICINE

## 2023-01-07 PROCEDURE — 85025 COMPLETE CBC W/AUTO DIFF WBC: CPT | Performed by: EMERGENCY MEDICINE

## 2023-01-07 PROCEDURE — 84484 ASSAY OF TROPONIN QUANT: CPT | Performed by: EMERGENCY MEDICINE

## 2023-01-07 RX ORDER — GADOBUTROL 604.72 MG/ML
10 INJECTION INTRAVENOUS ONCE
Status: COMPLETED | OUTPATIENT
Start: 2023-01-07 | End: 2023-01-07

## 2023-01-07 RX ORDER — LISINOPRIL 5 MG/1
5 TABLET ORAL DAILY
Qty: 14 TABLET | Refills: 0 | Status: SHIPPED | OUTPATIENT
Start: 2023-01-07 | End: 2023-02-01 | Stop reason: ALTCHOICE

## 2023-01-07 RX ADMIN — GADOBUTROL 10 ML: 604.72 INJECTION INTRAVENOUS at 06:50

## 2023-01-07 ASSESSMENT — ENCOUNTER SYMPTOMS
NUMBNESS: 1
MYALGIAS: 1
DIAPHORESIS: 1
WEAKNESS: 0
LIGHT-HEADEDNESS: 1
DIZZINESS: 1
NECK PAIN: 0

## 2023-01-07 ASSESSMENT — ACTIVITIES OF DAILY LIVING (ADL)
ADLS_ACUITY_SCORE: 35
ADLS_ACUITY_SCORE: 35

## 2023-01-07 NOTE — ED TRIAGE NOTES
Pt walked in thru triage and reports that At 0130 pt woke up to use the bathroom developed sudden dizziness and numbness to his face. Also reports bilateral arm numbness for about 2 weeks. Hx. Of HTN -not taking any meds.     Triage Assessment     Row Name 01/07/23 0405       Triage Assessment (Adult)    Airway WDL WDL       Respiratory WDL    Respiratory WDL WDL       Skin Circulation/Temperature WDL    Skin Circulation/Temperature WDL WDL       Cardiac WDL    Cardiac WDL WDL       Peripheral/Neurovascular WDL    Peripheral Neurovascular WDL WDL       Cognitive/Neuro/Behavioral WDL    Cognitive/Neuro/Behavioral WDL WDL

## 2023-01-07 NOTE — DISCHARGE INSTRUCTIONS
You need to follow-up with primary care provider as soon as you can  Will start you on blood pressure medication but need close follow-up

## 2023-01-07 NOTE — ED NOTES
Bed: ED21  Expected date:   Expected time:   Means of arrival:   Comments:  Triage -stroke symptoms

## 2023-01-07 NOTE — ED PROVIDER NOTES
"    History     Chief Complaint:  Numbness       HPI   Saran Agudelo is a 57 year old male with history of hypertension, PE, DVT who presents with facial numbness and dizziness. He states that at 0130 he woke up to use the bathroom, laid back down and face became \"prickly\" and numb around his cheeks and mouth and became dizzy. This lasted about a minute and when he stood up he lost his balance. He also notes of chest discomfort earlier today accompanied by diaphoresis. He denies current chest pain. Coming into the ED, he was lightheaded but his numbness had decreased. He reports that over the past 2 weeks he has had bilateral arm numbness. He states that his numbness is improving, but is still present now. He reports that this is positional and improves when he changes his sleeping positions. He reports of pain when making a fist but denies any weakness. He said within the last year he has been having episodes that include \"a sliver in his vision\", and becomes lightheaded and cross eyed, which resolves. He reports he has history of hypertension, but is not medicated for this. He states the last time his blood pressure was checked was in 2015 and was 160s-170s. He denies neck pain.    Independent Historian: Yes    Review of External Notes: I reviewed the nurse triage note on 01/02/23 when he was calling due to lower arm and hand numbness.     ROS:  Review of Systems   Constitutional: Positive for diaphoresis.   Musculoskeletal: Positive for myalgias. Negative for neck pain.   Neurological: Positive for dizziness, light-headedness and numbness. Negative for weakness.   All other systems reviewed and are negative.      Allergies:  Bees  Compazine [Prochlorperazine]  Droperidol  Penicillins     Medications:    Aspirin 81    Past Medical History:    Hyperlipidemia  Nephrolithiasis  CKD, stage 3  Pulmonary embolism  DVT  Bursal cyst  Hypertension     Family History:    Father- C.A.D, hypertension    Social " "History:  The patient presents to the ED alone.    Physical Exam     Patient Vitals for the past 24 hrs:   BP Temp Temp src Pulse Resp SpO2 Height Weight   01/07/23 0532 (!) 164/100 -- -- 78 15 97 % -- --   01/07/23 0512 (!) 177/109 -- -- 73 18 -- -- --   01/07/23 0502 (!) 179/104 -- -- 76 14 95 % -- --   01/07/23 0437 -- -- -- 78 13 98 % -- --   01/07/23 0427 (!) 189/106 -- -- -- -- 99 % -- --   01/07/23 0405 (!) 194/99 97.2  F (36.2  C) Temporal 89 20 98 % 1.778 m (5' 10\") 90.7 kg (200 lb)        Physical Exam  General: Sitting up in bed  Eyes:  The pupils are equal and round    Conjunctivae and sclerae are normal  ENT:    Wearing a mask  Neck:  Normal range of motion  CV:  Regular rate, regular rhythm    Radial pulses 2+ bilaterally     Skin warm and well perfused   Resp:  Non labored breathing on room air    No tachypnea    No cough heard, lungs clear bilaterally  GI:  Abdomen is soft, there is no rigidity    No distension    No rebound tenderness     No abdominal tenderness  MS:  Normal muscular tone  Skin:  No rash or acute skin lesions noted  Neuro:   Awake, alert.      SILT on bilateral UE/LE, face    Speech is normal and fluent.    Face is symmetric.     Finger to nose intact    No arm or leg drift    Moves all extremities equally  Psych: Normal affect.  Appropriate interactions.    Emergency Department Course   ECG:  ECG results from 01/07/23   EKG 12 lead     Value    Systolic Blood Pressure     Diastolic Blood Pressure     Ventricular Rate 71    Atrial Rate 71    PA Interval 138    QRS Duration 90        QTc 447    P Axis 52    R AXIS 34    T Axis 30    Interpretation ECG      Sinus rhythm  Septal infarct , age undetermined  Abnormal ECG  When compared with ECG of 05-APR-2014 10:04,  Septal infarct is now Present         Imaging:  MRA Angiogram Neck w/o & w Contrast   Final Result   IMPRESSION:   HEAD MRI:    1.  Overall unremarkable appearance to the brain with no finding for acute infarct, " intracranial hemorrhage, or abnormally enhancing mass.      HEAD MRA:    1.  No significant intracranial stenosis. No aneurysm and no high flow vascular malformation.      NECK MRA:   1.  No significant stenosis of the neck vessels by NASCET criteria.      MR Head w/o Contrast Angiogram   Final Result   IMPRESSION:   HEAD MRI:    1.  Overall unremarkable appearance to the brain with no finding for acute infarct, intracranial hemorrhage, or abnormally enhancing mass.      HEAD MRA:    1.  No significant intracranial stenosis. No aneurysm and no high flow vascular malformation.      NECK MRA:   1.  No significant stenosis of the neck vessels by NASCET criteria.      MR Brain w/o & w Contrast   Final Result   IMPRESSION:   HEAD MRI:    1.  Overall unremarkable appearance to the brain with no finding for acute infarct, intracranial hemorrhage, or abnormally enhancing mass.      HEAD MRA:    1.  No significant intracranial stenosis. No aneurysm and no high flow vascular malformation.      NECK MRA:   1.  No significant stenosis of the neck vessels by NASCET criteria.      XR Chest 2 Views   Final Result   IMPRESSION: Negative chest.         Report per radiology    Laboratory:  Labs Ordered and Resulted from Time of ED Arrival to Time of ED Departure   BASIC METABOLIC PANEL - Abnormal       Result Value    Sodium 139      Potassium 3.5      Chloride 102      Carbon Dioxide (CO2) 28      Anion Gap 9      Urea Nitrogen 24      Creatinine 1.38 (*)     Calcium 9.1      Glucose 137 (*)     GFR Estimate 60 (*)    TROPONIN I - Normal    Troponin I High Sensitivity 9     TROPONIN I - Normal    Troponin I High Sensitivity 7     CBC WITH PLATELETS AND DIFFERENTIAL    WBC Count 9.1      RBC Count 4.83      Hemoglobin 15.6      Hematocrit 45.3      MCV 94      MCH 32.3      MCHC 34.4      RDW 12.5      Platelet Count 200      % Neutrophils 72      % Lymphocytes 19      % Monocytes 8      % Eosinophils 1      % Basophils 0      %  Immature Granulocytes 0      NRBCs per 100 WBC 0      Absolute Neutrophils 6.4      Absolute Lymphocytes 1.7      Absolute Monocytes 0.8      Absolute Eosinophils 0.1      Absolute Basophils 0.0      Absolute Immature Granulocytes 0.0      Absolute NRBCs 0.0          Emergency Department Course & Assessments:    Interventions:  Medications   gadobutrol (GADAVIST) injection 10 mL (has no administration in time range)        Independent Interpretation (X-rays, CTs, rhythm strip):  I independently reviewed the xrays, EKG    Consultations/Discussion of Management or Tests:  0439 I obtained history and examined the patient as noted above       Disposition:  Home    Impression & Plan      Medical Decision Making:  Saran Agudelo is a 57-year-old male who presented to the emergency department with numbness.  His symptoms almost sound like presyncopal symptoms though do not have LOC.  He has a nonfocal exam.  Symptoms sound atypical for TIA.  He is very hypertensive here in the emergency department.  He has a history of hypertension and over the last 5 years his blood pressure has typically been systolic 160s.  He has never been on any medications for this and was told to follow-up in the past regarding this.  EKG shows sinus rhythm.  Initial blood work shows chronic kidney disease.  Creatinine is stable compared to prior creatinine.  Troponin was normal.  Second troponin also negative.  Chest x-ray was clear.  He also has been having bilateral hand numbness and arm pain that improves when he changes position at night.  He works with his hands as a .  This sounds more like a peripheral etiology of numbness and less likely central cause such as stroke.  He has no neck pain to suggest cervical disc disease.  He has no weakness on his upper extremities.  Do not think MRI of the cervical spine is indicated at this time.  I have very low suspicion for other etiologies such as ACS, aortic dissection, PE,  Guillain-Barré, MS, etc. stroke seems less likely as well but given his severe hypertension, MRIs obtained.MRI brain negative for stroke. I will plan to start him on lisinopril given his hypertension and discussed that he needs close outpatient follow-up which he was in agreement with.    Diagnosis:    ICD-10-CM    1. Benign essential hypertension  I10       2. Lightheadedness  R42       3. Numbness  R20.0           Scribe Disclosure:  I, Linda Vasquez, am serving as a scribe at 4:17 AM on 1/7/2023 to document services personally performed by Zoë Ashraf MD based on my observations and the provider's statements to me.     I, Lauren Pan, am serving as a scribe () on 1/7/2023 at 4:34 AM to personally document services performed by Zoë Ashraf MD based on my observations and the provider's statements to me.      1/7/2023   Zoë Ashraf MD Goertz, Maria Kristine, MD  01/07/23 0734       Zoë Ashraf MD  01/07/23 0847

## 2023-01-11 DIAGNOSIS — I10 PRIMARY HYPERTENSION: Primary | ICD-10-CM

## 2023-01-19 ENCOUNTER — LAB (OUTPATIENT)
Dept: LAB | Facility: CLINIC | Age: 58
End: 2023-01-19
Payer: COMMERCIAL

## 2023-01-19 DIAGNOSIS — I10 PRIMARY HYPERTENSION: ICD-10-CM

## 2023-01-19 LAB
ANION GAP SERPL CALCULATED.3IONS-SCNC: 16 MMOL/L (ref 7–15)
BUN SERPL-MCNC: 26.7 MG/DL (ref 6–20)
CALCIUM SERPL-MCNC: 9.8 MG/DL (ref 8.6–10)
CHLORIDE SERPL-SCNC: 103 MMOL/L (ref 98–107)
CHOLEST SERPL-MCNC: 136 MG/DL
CREAT SERPL-MCNC: 1.64 MG/DL (ref 0.67–1.17)
DEPRECATED HCO3 PLAS-SCNC: 22 MMOL/L (ref 22–29)
GFR SERPL CREATININE-BSD FRML MDRD: 48 ML/MIN/1.73M2
GLUCOSE SERPL-MCNC: 94 MG/DL (ref 70–99)
HDLC SERPL-MCNC: 39 MG/DL
LDLC SERPL CALC-MCNC: 80 MG/DL
NONHDLC SERPL-MCNC: 97 MG/DL
POTASSIUM SERPL-SCNC: 4.4 MMOL/L (ref 3.4–5.3)
SODIUM SERPL-SCNC: 141 MMOL/L (ref 136–145)
TRIGL SERPL-MCNC: 85 MG/DL
TSH SERPL DL<=0.005 MIU/L-ACNC: 8.02 UIU/ML (ref 0.3–4.2)

## 2023-01-19 PROCEDURE — 80048 BASIC METABOLIC PNL TOTAL CA: CPT

## 2023-01-19 PROCEDURE — 84443 ASSAY THYROID STIM HORMONE: CPT

## 2023-01-19 PROCEDURE — 80061 LIPID PANEL: CPT

## 2023-01-19 PROCEDURE — 36415 COLL VENOUS BLD VENIPUNCTURE: CPT

## 2023-01-23 DIAGNOSIS — R79.89 ELEVATED TSH: Primary | ICD-10-CM

## 2023-01-25 ENCOUNTER — LAB (OUTPATIENT)
Dept: LAB | Facility: CLINIC | Age: 58
End: 2023-01-25
Payer: COMMERCIAL

## 2023-01-25 DIAGNOSIS — R79.89 ELEVATED TSH: ICD-10-CM

## 2023-01-25 LAB — T4 FREE SERPL-MCNC: 0.84 NG/DL (ref 0.9–1.7)

## 2023-01-25 PROCEDURE — 36415 COLL VENOUS BLD VENIPUNCTURE: CPT

## 2023-01-25 PROCEDURE — 84439 ASSAY OF FREE THYROXINE: CPT

## 2023-02-01 ENCOUNTER — OFFICE VISIT (OUTPATIENT)
Dept: INTERNAL MEDICINE | Facility: CLINIC | Age: 58
End: 2023-02-01
Payer: COMMERCIAL

## 2023-02-01 VITALS
DIASTOLIC BLOOD PRESSURE: 74 MMHG | HEIGHT: 70 IN | HEART RATE: 87 BPM | OXYGEN SATURATION: 98 % | BODY MASS INDEX: 27.17 KG/M2 | WEIGHT: 189.8 LBS | SYSTOLIC BLOOD PRESSURE: 132 MMHG | TEMPERATURE: 98.2 F

## 2023-02-01 DIAGNOSIS — I10 BENIGN ESSENTIAL HYPERTENSION: Primary | ICD-10-CM

## 2023-02-01 DIAGNOSIS — E03.9 HYPOTHYROIDISM, UNSPECIFIED TYPE: ICD-10-CM

## 2023-02-01 DIAGNOSIS — Z13.6 CARDIOVASCULAR SCREENING; LDL GOAL LESS THAN 130: ICD-10-CM

## 2023-02-01 DIAGNOSIS — N18.31 STAGE 3A CHRONIC KIDNEY DISEASE (H): ICD-10-CM

## 2023-02-01 PROCEDURE — 99204 OFFICE O/P NEW MOD 45 MIN: CPT | Performed by: INTERNAL MEDICINE

## 2023-02-01 RX ORDER — AMLODIPINE BESYLATE 2.5 MG/1
1 TABLET ORAL DAILY
COMMUNITY
Start: 2023-01-11 | End: 2023-12-22

## 2023-02-01 RX ORDER — LEVOTHYROXINE SODIUM 100 UG/1
100 TABLET ORAL DAILY
Qty: 90 TABLET | Refills: 0 | Status: SHIPPED | OUTPATIENT
Start: 2023-02-01 | End: 2023-04-28

## 2023-02-01 RX ORDER — HYDROCHLOROTHIAZIDE 12.5 MG/1
1 TABLET ORAL DAILY
COMMUNITY
Start: 2023-01-27 | End: 2023-12-18

## 2023-02-01 RX ORDER — LOSARTAN POTASSIUM 25 MG/1
1 TABLET ORAL DAILY
COMMUNITY
Start: 2023-01-11 | End: 2023-12-22

## 2023-02-01 ASSESSMENT — PAIN SCALES - GENERAL: PAINLEVEL: NO PAIN (0)

## 2023-02-01 NOTE — PATIENT INSTRUCTIONS
" Blood pressure well controlled on current medication.       Continue all medications at the same doses.      Thyroid levels are low, most probably due to autoimmune hypothyroidism, can be a familial thing.     Treatment is replacement with thyroid hormone supplement.      --start Levothyroxine 100 mcg once per day     Return for a \"lab only appointment\" in approximately 2 month to recheck labs (nonfasting).  I will make contact either via phone or [employer] regarding the results and any recommendations once I have reviewed them.       Shingles Vaccine (SHINGRIX):      I would recommend that you consider getting the Shingrix shingles vaccine.  The shingles vaccine is recommended for anyone over age 50.     The Shingrix vaccine is a series of 2 vaccines given 2-6 months apart.     The shingles vaccine does not stop you from getting shingles, but it decreases the intensity of the event, the duration of the event, and decreases the painful nerve condition that results     Based on the available data, the Shingrix vaccine has superior benefit and should be considered even if you have had the old Zostavax shinglesvaccine before.      Contact your insurance provider and ask them if either shingles vaccine is covered and is so, how much it will cost you.  Usually this will be cheaper to get in a pharmacy given by the pharmacist.    Regardless of the coverage, I would recommend that you consider the vaccine since shingles is very painful, (just ask anyone who has ever had it)    For Medicare insurance, the vaccine is cheaper to receive from a pharmacist in a pharmacy than for us to give you in the clinic.        5 GOALS TO PREVENT VASCULAR DISEASE:     1.  Aggressive blood pressure control, under 130/80 ideally.  Using medications if needed.    Your blood pressure is under good control    BP Readings from Last 4 Encounters:   02/01/23 132/74   01/07/23 (!) 179/112   02/11/20 (!) 163/95   12/05/17 (!) 160/102       2.  " "Aggressive LDL cholesterol (\"bad cholesterol\") lowering as indicated.    Your goal is an LDL under 130 for sure, preferably under 100.  (If you have diabetes or previous vascular disease, the the LDL goals would be under 100 for sure, preferably under 70.)    New guidelines identify four high-risk groups who could benefit from statins:   *people with pre-existing heart disease, such as those who have had a heart attack;   *people ages 40 to 75 who have diabetes of any type  *patients ages 40 to 75 with at least a 7.5% risk of developing cardiovascular disease over the next decade, according to a formula described in the guidelines  *patients with the sort of super-high cholesterol that sometimes runs in families, as evidenced by an LDL of 190 milligrams per deciliter or higher    Your cholesterol levels are well controlled.    Recent Labs   Lab Test 01/19/23  1624 12/02/14  0829   CHOL 136 129   HDL 39* 39*   LDL 80 72   TRIG 85 92   CHOLHDLRATIO  --  3.3       3.  Aggressive diabetic prevention, screening and/or management.      You do not have diabetes as of the most recent blood tests.     4.  No smoking    5.  Consider daily preventative aspirin over age 50 if you have enough cardiac risk factors to place you at higher risk for the presence of vascular disease.    If you have any reason not to take aspirin such easy bruising or bleeding, stomach problems, other anticoagulant medications, or any other side effects, then you should not take Aspirin.     --Based on your current cardiac disease risk profile and/or age over 75, you do NOT need to take daily preventative aspirin.          "

## 2023-02-01 NOTE — PROGRESS NOTES
"  Assessment & Plan     (I10) Benign essential hypertension  (primary encounter diagnosis)  Comment: initialy BP mildly elevated, near normal on repeat.   Tolerating meds fine, continue all current meds  Discussed current hypertension treatment guidelines, including indications for treatment and treatment options.  Discussed the importance for aggressive management of HTN to prevent vascular complications later.  Recommended lower fat, lower carbohydrate, and lower sodium (<2000 mg)diet.  Discussed required intervals for follow up on HTN, lab studies.  Recommened pt. occasionally follow their blood pressures outside the clinic to ensure that BPs are remaining within guidelines, and to contact me if the readings are not within guidelines on a regular basis so we can adjust treatment as needed.   Plan:     (N18.31) Stage 3a chronic kidney disease (H)  Comment: creat usually 1.3 - 1.6 range for 10+ years.   Will need to keep that in mind when titrating bp meds.     Plan:     (E03.9) Hypothyroidism, unspecified type  Comment: probable autoimmune hypothyroidism.   Start thyroid supplementation.   Recheck labs in 2 months titrate as indicated.   Will also check anti thyroid antibodies at that time.   Plan: TSH, T4, free, levothyroxine         (SYNTHROID/LEVOTHROID) 100 MCG tablet               (Z13.6) CARDIOVASCULAR SCREENING; LDL GOAL LESS THAN 130  Comment: Discussed cardiac disease risk factors and cardiac disease risk factor modification.   Plan:               MED REC REQUIRED  Post Medication Reconciliation Status:       BMI:   Estimated body mass index is 27.23 kg/m  as calculated from the following:    Height as of this encounter: 1.778 m (5' 10\").    Weight as of this encounter: 86.1 kg (189 lb 12.8 oz).           Return in about 6 months (around 8/1/2023) for Annual physical.    Helio Menendez MD  [facility]    Steve Noonan is a 57 year old, presenting for the " "following health issues:  Hospital F/U      HPI     ED/UC Followup:    Facility:  [facility]  Date of visit: 1/7/23  Reason for visit: HTN, lightheadedness, dizziness,  facial numbness  Current Status:  New meds helping-BP is coming down but still  high-has been mfeeling a bit dizziness and nurse advised splitting up meds to in the morning and before bed    Recent greaty leleabvted BP with headache, etc.   Seen by cardiology clinic, started on low doses of multiple meds as part of HTN study,   Tolerating meds fine, no side effects reported.     TSH elevated alpong with Free t4.  Has strong family history of hypothyroidfism, with sister and mother on thyroid repalcement.     chronic kidney disease for over 15 years since DVT.   Creatinines rountlieny 1.3-1.6 range since at least 2009.     Reviewed labs:    Lab Results   Component Value Date    CR 1.64 01/19/2023    CR 1.38 01/07/2023    CR 1.51 12/02/2014    CR 1.57 10/02/2014    CR 1.57 06/24/2014    CR 1.35 04/07/2014    CR 1.37 04/06/2014    CR 1.54 04/05/2014    CR 1.66 04/03/2014    CR 1.42 04/16/2013    CR 1.36 12/23/2009          **I reviewed the information recorded in the patient's EPIC chart (including but not limited to medical history, surgical history, family history, problem list, medication list, and allergy list) and updated the information as indicated based on the patients reported information.         Review of Systems   Constitutional, HEENT, cardiovascular, pulmonary, gi and gu systems are negative, except as otherwise noted.      Objective    /74   Pulse 87   Temp 98.2  F (36.8  C) (Oral)   Ht 1.778 m (5' 10\")   Wt 86.1 kg (189 lb 12.8 oz)   SpO2 98%   BMI 27.23 kg/m    Body mass index is 27.23 kg/m .  Physical Exam   GENERAL alert and no distress  EYES:  Normal sclera,conjunctiva, EOMI  HENT: oral and posterior pharynx without lesions or erythema, facies symmetric  NECK: Neck supple. No LAD, without thyroidmegaly.  RESP: Clear " to ausculation bilaterally without wheezes or crackles. Normal BS in all fields.  CV: RRR normal S1S2 without murmurs, rubs or gallops.  LYMPH: no cervical lymph adenopathy appreciated  MS: extremities- no gross deformities of the visible extremities noted,   EXT:  no lower extremity edema  PSYCH: Alert and oriented times 3; speech- coherent  SKIN:  No obvious significant skin lesions on visible portions of face

## 2023-04-28 DIAGNOSIS — E03.9 HYPOTHYROIDISM, UNSPECIFIED TYPE: ICD-10-CM

## 2023-04-28 RX ORDER — LEVOTHYROXINE SODIUM 112 UG/1
112 TABLET ORAL DAILY
Qty: 90 TABLET | Refills: 0 | Status: SHIPPED | OUTPATIENT
Start: 2023-04-28 | End: 2023-07-25

## 2023-04-28 NOTE — TELEPHONE ENCOUNTER
Pt calling requesting update on refill. Pt thought refill would be available and went to pharmacy.     Pt is has ~3 pills left and would like to  tomorrow. Pt apologized for waiting so long to request refill. Okay to give one month saorj as pt has not rechecked labs?    Routing to provider for review as high priority.     On chart review, pt is due for labs--when calling pt back please assist in scheduling.     Vicente Milligan RN

## 2023-07-24 ENCOUNTER — TELEPHONE (OUTPATIENT)
Dept: INTERNAL MEDICINE | Facility: CLINIC | Age: 58
End: 2023-07-24
Payer: COMMERCIAL

## 2023-07-24 NOTE — TELEPHONE ENCOUNTER
"While I am not familiar with the specific product he is taking, typically this type of recovery supplement should not pose any issues with his hypertension.    If he notices his blood pressure is going up significantly after starting the supplement, then he should stop it.    I recommend that he stay away from any sort of stimulant based products typically found in many \"pre-workout\" products or \"energy\" products.  Though stimulants can elevate blood pressure and heart rate.    Close encounter when done.   "

## 2023-07-24 NOTE — TELEPHONE ENCOUNTER
Patient states that he recently started working out.   He wants to take amino acid recovery by [employer].  He is asking if it is OK to take this with his current blood pressure medications. Kimmy Taylor RN

## 2023-07-25 NOTE — TELEPHONE ENCOUNTER
Called and spoke to the patient. Relayed message from the provider. Patient expressed understanding and had no additional questions at this time.       Mehreen Woodward RN

## 2023-08-07 ENCOUNTER — OFFICE VISIT (OUTPATIENT)
Dept: INTERNAL MEDICINE | Facility: CLINIC | Age: 58
End: 2023-08-07
Payer: COMMERCIAL

## 2023-08-07 VITALS
HEIGHT: 70 IN | DIASTOLIC BLOOD PRESSURE: 78 MMHG | TEMPERATURE: 98.5 F | SYSTOLIC BLOOD PRESSURE: 126 MMHG | HEART RATE: 75 BPM | WEIGHT: 191 LBS | BODY MASS INDEX: 27.35 KG/M2 | OXYGEN SATURATION: 99 %

## 2023-08-07 DIAGNOSIS — I10 BENIGN ESSENTIAL HYPERTENSION: ICD-10-CM

## 2023-08-07 DIAGNOSIS — N18.31 STAGE 3A CHRONIC KIDNEY DISEASE (H): Primary | ICD-10-CM

## 2023-08-07 DIAGNOSIS — E78.5 HYPERLIPIDEMIA LDL GOAL <160: ICD-10-CM

## 2023-08-07 DIAGNOSIS — E03.9 HYPOTHYROIDISM, UNSPECIFIED TYPE: ICD-10-CM

## 2023-08-07 DIAGNOSIS — Z23 NEED FOR DIPHTHERIA-TETANUS-PERTUSSIS (TDAP) VACCINE: ICD-10-CM

## 2023-08-07 LAB
ANION GAP SERPL CALCULATED.3IONS-SCNC: 11 MMOL/L (ref 7–15)
BUN SERPL-MCNC: 32.4 MG/DL (ref 6–20)
CALCIUM SERPL-MCNC: 9.4 MG/DL (ref 8.6–10)
CHLORIDE SERPL-SCNC: 103 MMOL/L (ref 98–107)
CREAT SERPL-MCNC: 1.81 MG/DL (ref 0.67–1.17)
CREAT UR-MCNC: 144 MG/DL
DEPRECATED HCO3 PLAS-SCNC: 26 MMOL/L (ref 22–29)
GFR SERPL CREATININE-BSD FRML MDRD: 43 ML/MIN/1.73M2
GLUCOSE SERPL-MCNC: 95 MG/DL (ref 70–99)
MICROALBUMIN UR-MCNC: <12 MG/L
MICROALBUMIN/CREAT UR: NORMAL MG/G{CREAT}
POTASSIUM SERPL-SCNC: 4.3 MMOL/L (ref 3.4–5.3)
SODIUM SERPL-SCNC: 140 MMOL/L (ref 136–145)
TSH SERPL DL<=0.005 MIU/L-ACNC: 3.42 UIU/ML (ref 0.3–4.2)

## 2023-08-07 PROCEDURE — 90715 TDAP VACCINE 7 YRS/> IM: CPT | Performed by: INTERNAL MEDICINE

## 2023-08-07 PROCEDURE — 36415 COLL VENOUS BLD VENIPUNCTURE: CPT | Performed by: INTERNAL MEDICINE

## 2023-08-07 PROCEDURE — 99214 OFFICE O/P EST MOD 30 MIN: CPT | Mod: 25 | Performed by: INTERNAL MEDICINE

## 2023-08-07 PROCEDURE — 82043 UR ALBUMIN QUANTITATIVE: CPT | Performed by: INTERNAL MEDICINE

## 2023-08-07 PROCEDURE — 82570 ASSAY OF URINE CREATININE: CPT | Performed by: INTERNAL MEDICINE

## 2023-08-07 PROCEDURE — 90471 IMMUNIZATION ADMIN: CPT | Performed by: INTERNAL MEDICINE

## 2023-08-07 PROCEDURE — 80048 BASIC METABOLIC PNL TOTAL CA: CPT | Performed by: INTERNAL MEDICINE

## 2023-08-07 PROCEDURE — 84443 ASSAY THYROID STIM HORMONE: CPT | Performed by: INTERNAL MEDICINE

## 2023-08-07 RX ORDER — LEVOTHYROXINE SODIUM 112 UG/1
112 TABLET ORAL DAILY
Qty: 30 TABLET | Refills: 0 | Status: CANCELLED | OUTPATIENT
Start: 2023-08-07

## 2023-08-07 NOTE — LETTER
August 16, 2023      Saran Agudelo  [address]   [facility] [address]        Dear ,    We are writing to inform you of your test results.    Labs look OK, similar and stable to prior levels.   The thyroid levels is normal, continue this current dose of thyroid medication.  I sent refills to your usual pharmacy that you can call for when you run out.   The electrolytes and glucose are normal.  Urine negative for protein.    The renal ( kidney) function remains slightly elevated, but similar and stable to prior readings.   Continue all medications at the same doses or as directed through the [facility].   Contact your usual pharmacy if you need refills.        Resulted Orders   Basic metabolic panel   Result Value Ref Range    Sodium 140 136 - 145 mmol/L    Potassium 4.3 3.4 - 5.3 mmol/L    Chloride 103 98 - 107 mmol/L    Carbon Dioxide (CO2) 26 22 - 29 mmol/L    Anion Gap 11 7 - 15 mmol/L    Urea Nitrogen 32.4 (H) 6.0 - 20.0 mg/dL    Creatinine 1.81 (H) 0.67 - 1.17 mg/dL    Calcium 9.4 8.6 - 10.0 mg/dL    Glucose 95 70 - 99 mg/dL    GFR Estimate 43 (L) >60 mL/min/1.73m2   TSH with free T4 reflex   Result Value Ref Range    TSH 3.42 0.30 - 4.20 uIU/mL   Albumin Random Urine Quantitative with Creat Ratio   Result Value Ref Range    Creatinine Urine mg/dL 144.0 mg/dL      Comment:      The reference ranges have not been established in urine creatinine. The results should be integrated into the clinical context for interpretation.    Albumin Urine mg/L <12.0 mg/L      Comment:      The reference ranges have not been established in urine albumin. The results should be integrated into the clinical context for interpretation.    Albumin Urine mg/g Cr        Comment:      Unable to calculate, urine albumin and/or urine creatinine is outside detectable limits.  Microalbuminuria is defined as an albumin:creatinine ratio of 17 to 299 for males and 25 to 299 for females. A  ratio of albumin:creatinine of 300 or higher is indicative of overt proteinuria.  Due to biologic variability, positive results should be confirmed by a second, first-morning random or 24-hour timed urine specimen. If there is discrepancy, a third specimen is recommended. When 2 out of 3 results are in the microalbuminuria range, this is evidence for incipient nephropathy and warrants increased efforts at glucose control, blood pressure control, and institution of therapy with an angiotensin-converting-enzyme (ACE) inhibitor (if the patient can tolerate it).         If you have any questions or concerns, please call the clinic at the number listed above.       Sincerely,      Helio Menendez MD

## 2023-08-07 NOTE — PROGRESS NOTES
"  Assessment & Plan     (N18.31) Stage 3a chronic kidney disease (H)  (primary encounter diagnosis)  Comment: repeat labs, continue to manage risk factors aggressively.   Takign ARB, taking statin,   Avodi NSAIDs as best possible.   Plan: REVIEW OF HEALTH MAINTENANCE PROTOCOL ORDERS,         Basic metabolic panel, TSH with free T4 reflex,        Albumin Random Urine Quantitative with Creat         Ratio            (E03.9) Hypothyroidism, unspecified type  Comment: recheck labs, titrate dose as indiacted.   Plan: REVIEW OF HEALTH MAINTENANCE PROTOCOL ORDERS,         TSH with free T4 reflex            (E78.5) Hyperlipidemia LDL goal <160  Comment: This condition is currently controlled on the current medical regimen.  Continue current therapy.   Discussed guidelines recommending a statin cholesterol lowering medication for any patient with either diabetes and/or vascular disease, aiming for a LDL goal under 100 for sure, ideally under 70, using whatever dose of statin tolerated.    Plan: REVIEW OF HEALTH MAINTENANCE PROTOCOL ORDERS            (I10) Benign essential hypertension  Comment: This condition is currently controlled on the current medical regimen.  Continue current therapy.   Plan: REVIEW OF HEALTH MAINTENANCE PROTOCOL ORDERS,         TSH with free T4 reflex, Albumin Random Urine         Quantitative with Creat Ratio            (Z23) Need for diphtheria-tetanus-pertussis (Tdap) vaccine  Comment:   Plan: TDAP 10-64Y (ADACEL,BOOSTRIX)                        Nicotine/Tobacco Cessation:  He reports that he has been smoking cigarettes. He has never used smokeless tobacco.  Nicotine/Tobacco Cessation Plan:   Information offered: Patient not interested at this time      BMI:   Estimated body mass index is 27.41 kg/m  as calculated from the following:    Height as of this encounter: 1.778 m (5' 10\").    Weight as of this encounter: 86.6 kg (191 lb).           Helio Menendez MD  [facility] " [facility] Noonan is a 57 year old, presenting for the following health issues:  Recheck Medication (Follow up for refills)      History of Present Illness       Reason for visit:  Follow up on previous visit    He eats 2-3 servings of fruits and vegetables daily.He consumes 1 sweetened beverage(s) daily.He exercises with enough effort to increase his heart rate 60 or more minutes per day.  He exercises with enough effort to increase his heart rate 6 days per week.   He is taking medications regularly.     Hyperlipidemia Follow-Up    Are you regularly taking any medication or supplement to lower your cholesterol?   No  Are you having muscle aches or other side effects that you think could be caused by your cholesterol lowering medication?  N/A    Hypertension Follow-up    Do you check your blood pressure regularly outside of the clinic? Yes   Are you following a low salt diet?   Are your blood pressures ever more than 140 on the top number (systolic) OR more   than 90 on the bottom number (diastolic), for example 140/90? No    Hypothyroidism Follow-up    Since last visit, patient describes the following symptoms: Weight stable, no hair loss, no skin changes, no constipation, no loose stools      History of chronic kidney disease presumed due to HTN  Reviewed most recent labs:    Lab Results   Component Value Date    CR 1.81 08/07/2023    CR 1.64 01/19/2023    CR 1.38 01/07/2023    CR 1.51 12/02/2014    CR 1.57 10/02/2014    CR 1.57 06/24/2014    CR 1.35 04/07/2014    CR 1.37 04/06/2014    CR 1.54 04/05/2014    CR 1.66 04/03/2014    CR 1.42 04/16/2013    CR 1.36 12/23/2009           **I reviewed the information recorded in the patient's EPIC chart (including but not limited to medical history, surgical history, family history, problem list, medication list, and allergy list) and updated the information as indicated based on the patients reported information.         Review of Systems  "  Constitutional, HEENT, cardiovascular, pulmonary, gi and gu systems are negative, except as otherwise noted.      Objective    /78   Pulse 75   Temp 98.5  F (36.9  C) (Oral)   Ht 1.778 m (5' 10\")   Wt 86.6 kg (191 lb)   SpO2 99%   BMI 27.41 kg/m    Body mass index is 27.41 kg/m .  Physical Exam   GENERAL alert and no distress  EYES:  Normal sclera,conjunctiva, EOMI  HENT: oral and posterior pharynx without lesions or erythema, facies symmetric  NECK: Neck supple. No LAD, without thyroidmegaly.  RESP: Clear to ausculation bilaterally without wheezes or crackles. Normal BS in all fields.  CV: RRR normal S1S2 without murmurs, rubs or gallops.  LYMPH: no cervical lymph adenopathy appreciated  MS: extremities- no gross deformities of the visible extremities noted,   EXT:  no lower extremity edema  PSYCH: Alert and oriented times 3; speech- coherent  SKIN:  No obvious significant skin lesions on visible portions of face                       "

## 2023-08-10 ENCOUNTER — TELEPHONE (OUTPATIENT)
Dept: INTERNAL MEDICINE | Facility: CLINIC | Age: 58
End: 2023-08-10
Payer: COMMERCIAL

## 2023-08-10 NOTE — TELEPHONE ENCOUNTER
Pt called clinic requesting lab results from 8/7. Routing request to provider; please review/advise.    Pt requesting callback from clinic    Can we leave a detailed message on this number? YES  Phone number patient can be reached at: Home number on file [phone] (home)    Kristal Frias RN  [facility] Triage

## 2023-08-16 RX ORDER — LEVOTHYROXINE SODIUM 112 UG/1
112 TABLET ORAL DAILY
Qty: 90 TABLET | Refills: 3 | Status: SHIPPED | OUTPATIENT
Start: 2023-08-16 | End: 2024-05-20

## 2023-08-17 NOTE — TELEPHONE ENCOUNTER
Patient Contact    Attempt # 1    Was call answered?  Yes. Relayed provider message. Pt verbalizes understanding and agrees to plan of care.

## 2023-08-17 NOTE — TELEPHONE ENCOUNTER
Labs look OK, similar and stable to prior levels.   The thyroid levels is normal, continue this current dose of thyroid medication.  I sent refills to his usual pharmacy that you can call for when you run out.   The electrolytes and glucose are normal.  Urine negative for protein.    The renal ( kidney) function remains slightly elevated, but similar and stable to prior readings.   Continue all medications at the same doses or as directed through the [facility].   Contact your usual pharmacy if you need refills.

## 2023-11-02 ENCOUNTER — OFFICE VISIT (OUTPATIENT)
Dept: URGENT CARE | Facility: URGENT CARE | Age: 58
End: 2023-11-02
Payer: COMMERCIAL

## 2023-11-02 VITALS
RESPIRATION RATE: 16 BRPM | OXYGEN SATURATION: 99 % | WEIGHT: 192 LBS | SYSTOLIC BLOOD PRESSURE: 123 MMHG | HEART RATE: 77 BPM | DIASTOLIC BLOOD PRESSURE: 77 MMHG | BODY MASS INDEX: 27.55 KG/M2 | TEMPERATURE: 98.2 F

## 2023-11-02 DIAGNOSIS — L03.319 CELLULITIS AND ABSCESS OF TRUNK: ICD-10-CM

## 2023-11-02 DIAGNOSIS — L02.219 CELLULITIS AND ABSCESS OF TRUNK: ICD-10-CM

## 2023-11-02 DIAGNOSIS — L72.3 SEBACEOUS CYST: Primary | ICD-10-CM

## 2023-11-02 PROCEDURE — 99213 OFFICE O/P EST LOW 20 MIN: CPT | Performed by: PHYSICIAN ASSISTANT

## 2023-11-02 RX ORDER — DOXYCYCLINE 100 MG/1
100 CAPSULE ORAL 2 TIMES DAILY
Qty: 20 CAPSULE | Refills: 0 | Status: SHIPPED | OUTPATIENT
Start: 2023-11-02 | End: 2023-11-28

## 2023-11-02 NOTE — PROGRESS NOTES
"  Assessment & Plan     Sebaceous cyst    Patient has had a cyst on right side trunk  Just recently its opened up and is draining      Cellulitis and abscess of trunk    A skin abscess is a bacterial infection that forms a pocket of pus. A boil is a kind of skin abscess. The doctor may have cut an opening in the abscess so that the pus can drain out. You may have gauze in the cut so that the abscess will stay open and keep draining. You may need antibiotics. You will need to follow up with your doctor to make sure the infection has gone away.     - doxycycline monohydrate (MONODOX) 100 MG capsule; Take 1 capsule (100 mg) by mouth 2 times daily     BMI:   Estimated body mass index is 27.55 kg/m  as calculated from the following:    Height as of 8/7/23: 1.778 m (5' 10\").    Weight as of this encounter: 87.1 kg (192 lb).       At today's visit with Saran Agudelo , we discussed results, diagnosis, medications and formulated a plan.  We also discussed red flags for immediate return to clinic/ER, as well as indications for follow up with PCP if not improved in 3 days. Patient understood and agreed to plan. Saran Agudelo was discharged with stable vitals and has no further questions.       No follow-ups on file.    Amilcar Hollis, [facility], PA-C  [facility] URGENT [facility]    Steve Noonan is a 58 year old, presenting for the following health issues:  Derm Problem (6 months worse in last 2 month-lump on right hip-getting bigger, redness, was able to squeeze stuff out)      HPI   Review of Systems   Constitutional, HEENT, cardiovascular, pulmonary, gi and gu systems are negative, except as otherwise noted.      Objective    /77   Pulse 77   Temp 98.2  F (36.8  C)   Resp 16   Wt 87.1 kg (192 lb)   SpO2 99%   BMI 27.55 kg/m    Body mass index is 27.55 kg/m .  Physical Exam   GENERAL: healthy, alert and no distress  ABDOMEN: soft, nontender, no hepatosplenomegaly, no masses and bowel sounds " normal  MS: postiive for cyst right side abdomen , side  SKIN: Pos for abscess that is draining  NEURO: Normal strength and tone, mentation intact and speech normal  PSYCH: mentation appears normal, affect normal/bright

## 2023-11-23 ENCOUNTER — HOSPITAL ENCOUNTER (EMERGENCY)
Facility: CLINIC | Age: 58
Discharge: HOME | End: 2023-11-23
Attending: EMERGENCY MEDICINE | Admitting: EMERGENCY MEDICINE
Payer: COMMERCIAL

## 2023-11-23 ENCOUNTER — APPOINTMENT (OUTPATIENT)
Dept: ULTRASOUND IMAGING | Facility: CLINIC | Age: 58
End: 2023-11-23
Attending: EMERGENCY MEDICINE
Payer: COMMERCIAL

## 2023-11-23 VITALS
TEMPERATURE: 97.5 F | OXYGEN SATURATION: 99 % | BODY MASS INDEX: 36.82 KG/M2 | WEIGHT: 195 LBS | DIASTOLIC BLOOD PRESSURE: 85 MMHG | RESPIRATION RATE: 20 BRPM | SYSTOLIC BLOOD PRESSURE: 126 MMHG | HEART RATE: 64 BPM | HEIGHT: 61 IN

## 2023-11-23 DIAGNOSIS — I82.411 ACUTE DEEP VEIN THROMBOSIS (DVT) OF FEMORAL VEIN OF RIGHT LOWER EXTREMITY (H): ICD-10-CM

## 2023-11-23 DIAGNOSIS — R03.0 ELEVATED BLOOD PRESSURE READING: ICD-10-CM

## 2023-11-23 LAB — RADIOLOGIST FLAGS: ABNORMAL

## 2023-11-23 PROCEDURE — 99284 EMERGENCY DEPT VISIT MOD MDM: CPT | Mod: 25

## 2023-11-23 PROCEDURE — 93970 EXTREMITY STUDY: CPT

## 2023-11-23 RX ORDER — APIXABAN 5 MG (74)
KIT ORAL
Qty: 74 EACH | Refills: 0 | Status: SHIPPED | OUTPATIENT
Start: 2023-11-23 | End: 2023-12-23

## 2023-11-23 ASSESSMENT — ACTIVITIES OF DAILY LIVING (ADL)
ADLS_ACUITY_SCORE: 35
ADLS_ACUITY_SCORE: 35

## 2023-11-23 NOTE — ED PROVIDER NOTES
"  History     Chief Complaint:  Leg Pain       The history is provided by the patient.      Saran Agudelo is a 58 year old male with a history of DVT who presents due to right leg pain and swelling that began about one week ago. He reports worsening pain over the past few days leading him to present to the ED. He has a history of a DVT 5 years and had taken a course of Warfarin. He is no longer anticoagulated. The patient does currently smoke cigarettes. The patient denies trauma or injury to his leg. He also denies fever, chest pain, shortness of breath. The patient denies recent travel.       Independent Historian:   None - Patient Only    Review of External Notes:   Hematology clinic note from 11/10/2014      Medications:    Amlodipine   Monodox   Hydrochlorothiazide   Levothyroxine   Cozaar    Past Medical History:    Hyperlipidemia   Hypothyroidism   DVT   CKD    PE     Physical Exam   Patient Vitals for the past 24 hrs:   BP Temp Temp src Pulse Resp SpO2 Height Weight   11/23/23 1055 (!) 193/83 97.5  F (36.4  C) Temporal 82 20 100 % 1.554 m (5' 1.2\") 88.5 kg (195 lb)        Physical Exam  Nursing note and vitals reviewed.  Constitutional:  Oriented to person, place, and time. Cooperative.   HENT:   Nose:    Nose normal.   Mouth/Throat:   Mucous membranes are normal.   Eyes:    Conjunctivae normal and EOM are normal.      Pupils are equal, round, and reactive to light.   Neck:    Trachea normal.   Cardiovascular:  Normal rate, regular rhythm, normal heart sounds and normal pulses. No murmur heard.  Pulmonary/Chest:  Effort normal and breath sounds normal.   Abdominal:   Soft. Normal appearance and bowel sounds are normal.      There is no tenderness.      There is no rebound and no CVA tenderness.   Musculoskeletal:  Both calves are normal in appearance with some mild tenderness to palpation to both of them.  Extremities otherwise atraumatic x 4.   Lymphadenopathy:  No cervical adenopathy.   Neurological: "   Alert and oriented to person, place, and time. Normal strength.      No cranial nerve deficit or sensory deficit. GCS eye subscore is 4. GCS verbal subscore is 5. GCS motor subscore is 6.   Skin:    Skin is intact. No rash noted.   Psychiatric:   Normal mood and affect.    Emergency Department Course     Imaging:  US Lower Extremity Venous Duplex Bilateral    (Results Pending)   Report per radiology.      Emergency Department Course & Assessments:         Assessments:  1052 I obtained history and examined the patient as noted above.     Independent Interpretation (X-rays, CTs, rhythm strip):  None    Consultations/Discussion of Management or Tests:  None        Social Determinants of Health affecting care:   None    Disposition:  The patient was discharged to home.     Impression & Plan    Medical Decision Making:  This is a 58-year-old male who came in for further evaluation of calf pain.  He actually initially told me it was his left calf, although he apparently did not tell one of the nurses that it was his right calf.  I initially ordered an ultrasound of his left lower extremity, but ultimately we ended up getting an ultrasound of both lower extremities to look for DVT.  He does appear to have DVT in the right lower extremity.  The radiologist called me directly to relay the results and that it does look like he has an acute component.  Therefore at this point I will discharge him with Eliquis.  His blood pressure was high upon arrival here as well, although it did come down on its own.  I recommended close outpatient follow-up with regards to his blood pressure as well as to obtain additional prescriptions for Eliquis.  He knows to return here with any concerns or worsening symptoms as well.  He was also counseled regarding being on a blood thinner as well.      Diagnosis:    ICD-10-CM    1. Acute deep vein thrombosis (DVT) of femoral vein of right lower extremity (H)  I82.411       2. Elevated blood  pressure reading  R03.0            Discharge Medications:  New Prescriptions    APIXABAN STARTER PACK (ELIQUIS DVT/PE STARTER PACK) 5 MG TBPK    Take 10 mg by mouth 2 times daily for 7 days, THEN 5 mg 2 times daily for 23 days.     Scribe Disclosure:  I, Janine March, am serving as a scribe at 11:23 AM on 11/23/2023 to document services personally performed by London Donahue MD based on my observations and the provider's statements to me.     11/23/2023   London Donahue MD Lashkowitz, Seth H, MD  11/23/23 2598

## 2023-11-23 NOTE — ED TRIAGE NOTES
Lt leg pain for the past 5 days, hx of blood clots, not on blood thinners     Triage Assessment (Adult)       Row Name 11/23/23 1050          Triage Assessment    Airway WDL WDL        Respiratory WDL    Respiratory WDL WDL        Cardiac WDL    Cardiac WDL WDL        Cognitive/Neuro/Behavioral WDL    Cognitive/Neuro/Behavioral WDL WDL

## 2023-11-28 ENCOUNTER — OFFICE VISIT (OUTPATIENT)
Dept: INTERNAL MEDICINE | Facility: CLINIC | Age: 58
End: 2023-11-28
Payer: COMMERCIAL

## 2023-11-28 ENCOUNTER — TELEPHONE (OUTPATIENT)
Dept: OTHER | Facility: CLINIC | Age: 58
End: 2023-11-28

## 2023-11-28 VITALS
SYSTOLIC BLOOD PRESSURE: 130 MMHG | TEMPERATURE: 98.7 F | DIASTOLIC BLOOD PRESSURE: 76 MMHG | BODY MASS INDEX: 35.27 KG/M2 | HEIGHT: 61 IN | WEIGHT: 186.8 LBS | OXYGEN SATURATION: 99 % | HEART RATE: 72 BPM

## 2023-11-28 DIAGNOSIS — I82.401 RECURRENT ACUTE DEEP VEIN THROMBOSIS (DVT) OF RIGHT LOWER EXTREMITY (H): Primary | ICD-10-CM

## 2023-11-28 DIAGNOSIS — Z12.5 SCREENING FOR PROSTATE CANCER: ICD-10-CM

## 2023-11-28 DIAGNOSIS — N18.31 STAGE 3A CHRONIC KIDNEY DISEASE (H): ICD-10-CM

## 2023-11-28 DIAGNOSIS — E78.5 HYPERLIPIDEMIA LDL GOAL <160: ICD-10-CM

## 2023-11-28 DIAGNOSIS — Z87.891 PERSONAL HISTORY OF TOBACCO USE: ICD-10-CM

## 2023-11-28 DIAGNOSIS — E03.9 HYPOTHYROIDISM, UNSPECIFIED TYPE: ICD-10-CM

## 2023-11-28 DIAGNOSIS — L72.3 SEBACEOUS CYST: ICD-10-CM

## 2023-11-28 PROCEDURE — 99214 OFFICE O/P EST MOD 30 MIN: CPT | Performed by: INTERNAL MEDICINE

## 2023-11-28 ASSESSMENT — PAIN SCALES - GENERAL: PAINLEVEL: MILD PAIN (3)

## 2023-11-28 NOTE — TELEPHONE ENCOUNTER
Work Comp Pt: YES     Pt referred to [facility] by Helio Menendez MD for Recurrent acute deep vein thrombosis (DVT) of right lower extremity (H)    Pt needs to be scheduled for d-dimer asap and then new pt in consult with vascular medicine.  Will route to scheduling to coordinate an appointment at next available.    Appt note: new pt ref by Helio Menendez MD for Recurrent acute deep vein thrombosis (DVT) of right lower extremity (H). D-dimer to be scheduled asap.     NOVA CottoN, RN  [facility] [place]

## 2023-11-28 NOTE — PATIENT INSTRUCTIONS
" Given this second event of unprovoked significant blood clotting, you will need to remain on anticoagulation indefinitely.     Continue all other medications at the same doses.  Contact your usual pharmacy if you need refills.      Referral to vascular clinic to re-evaluate the DVT in the lower leg and consider if you need further treatment for remaining clot and to confirm that you need lifelong anticoagulation.      Continue all other medications at the same doses.  Contact your usual pharmacy if you need refills.      Lung cancer screening due to 20 pack year smoking history.  (To investigate for cause for the DVT)     Repeat colonoscopy in the spring due to the pre-cancerous polyp removed in 2015, but want to wait a few months before stopping the anticoagulation for a short period in case they need to do another biopsy or removed another polyp.       Referral to Dermatology clinic to evaluate the large sebaceous cysts on the back and side.       [facility]   [address]    [place] [address]   Phone: [phone]        Return to see me in 4-5 months, sooner if needed.  Please get fasting labs done at the [facility] or any other [facility] lab 1-2 days before this appointment (schedule a \"lab appointment\").  If you get the labs done at another clinic, make arrangements with them directly.  The orders will be in place.  Eat nothing for at least 8 hours prior to having these labs drawn.  Use [employer] or Call [phone] to schedule the appointment with me and lab appointment.            Lung Cancer Screening   Frequently Asked Questions  If you are at high-risk for lung cancer, getting screened with low-dose computed tomography (LDCT) every year can help save your life. This handout offers answers to some of the most common questions about lung cancer screening. If you have other questions, please call [phone] ([phone]).     What is " it?  Lung cancer screening uses special X-ray technology to create an image of your lung tissue. The exam is quick and easy and takes less than 10 seconds. We don t give you any medicine or use any needles. You can eat before and after the exam. You don t need to change your clothes as long as the clothing on your chest doesn t contain metal. But, you do need to be able to hold your breath for at least 6 seconds during the exam.    What is the goal of lung cancer screening?  The goal of lung cancer screening is to save lives. Many times, lung cancer is not found until a person starts having physical symptoms. Lung cancer screening can help detect lung cancer in the earliest stages when it may be easier to treat.    Who should be screened for lung cancer?  We suggest lung cancer screening for anyone who is at high-risk for lung cancer. You are in the high-risk group if you:     are between the ages of 55 and 79, and   have smoked at least 1 pack of cigarettes a day for 20 or more years, and   still smoke or have quit within the past 15 years.    However, if you have a new cough or shortness of breath, you should talk to your doctor before being screened.    Why does it matter if I have symptoms?  Certain symptoms can be a sign that you have a condition in your lungs that should be checked and treated by your doctor. These symptoms include fever, chest pain, a new or changing cough, shortness of breath that you have never felt before, coughing up blood or unexplained weight loss. Having any of these symptoms can greatly affect the results of lung cancer screening.       Should all smokers get an LDCT lung cancer screening exam?  It depends. Lung cancer screening is for a very specific group of men and women who have a history of heavy smoking over a long period of time (see  Who should be screened for lung cancer  above).  I am in the high-risk group, but have been diagnosed with cancer in the past. Is LDCT lung  cancer screening right for me?  In some cases, you should not have LDCT lung screening, such as when your doctor is already following your cancer with CT scan studies. Your doctor will help you decide if LDCT lung screening is right for you.  Do I need to have a screening exam every year?  Yes. If you are in the high-risk group described earlier, you should get an LDCT lung cancer screening exam every year until you are 79, or are no longer willing or able to undergo screening and possible procedures to diagnose and treat lung cancer.  How effective is LDCT at preventing death from lung cancer?  Studies have shown that LDCT lung cancer screening can lower the risk of death from lung cancer by 20 percent in people who are at high-risk.  What are the risks?  There are some risks and limitations of LDCT lung cancer screening. We want to make sure you understand the risks and benefits, so please let us know if you have any questions. Your doctor may want to talk with you more about these risks.   Radiation exposure: As with any exam that uses radiation, there is a very small increased risk of cancer. The amount of radiation in LDCT is small--about the same amount a person would get from a mammogram. Your doctor orders the exam when he or she feels the potential benefits outweigh the risks.   False negatives: No test is perfect, including LDCT. It is possible that you may have a medical condition, including lung cancer, that is not found during your exam. This is called a false negative result.   False positives and more testing: LDCT very often finds something in the lung that could be cancer, but in fact is not. This is called a false positive result. False positive tests often cause anxiety. To make sure these findings are not cancer, you may need to have more tests. These tests will be done only if you give us permission. Sometimes patients need a treatment that can have side effects, such as a biopsy. For more  information on false positives, see  What can I expect from the results?    Findings not related to lung cancer: Your LDCT exam also takes pictures of areas of your body next to your lungs. In a very small number of cases, the CT scan will show an abnormal finding in one of these areas, such as your kidneys, adrenal glands, liver or thyroid. This finding may not be serious, but you may need more tests. Your doctor can help you decide what other tests you may need, if any.  What can I expect from the results?  About 1 out of 4 LDCT exams will find something that may need more tests. Most of the time, these findings are lung nodules. Lung nodules are very small collections of tissue in the lung. These nodules are very common, and the vast majority--more than 97 percent--are not cancer (benign). Most are normal lymph nodes or small areas of scarring from past infections.  But, if a small lung nodule is found to be cancer, the cancer can be cured more than 90 percent of the time. To know if the nodule is cancer, we may need to get more images before your next yearly screening exam. If the nodule has suspicious features (for example, it is large, has an odd shape or grows over time), we will refer you to a specialist for further testing.  Will my doctor also get the results?  Yes. Your doctor will get a copy of your results.  Is it okay to keep smoking now that there s a cancer screening exam?  No. Tobacco is one of the strongest cancer-causing agents. It causes not only lung cancer, but other cancers and cardiovascular (heart) diseases as well. The damage caused by smoking builds over time. This means that the longer you smoke, the higher your risk of disease. While it is never too late to quit, the sooner you quit, the better.  Where can I find help to quit smoking?  The best way to prevent lung cancer is to stop smoking. If you have already quit smoking, congratulations and keep it up! For help on quitting smoking,  please call [employer] at 4-823-QUIT-NOW (1-335.408.6274) or the [employer] at 1-355.225.7249 to find local resources near you.  One-on-one health coaching:  If you d prefer to work individually with a health care provider on tobacco cessation, we offer:     Medication Therapy Management:  Our specially trained pharmacists work closely with you and your doctor to help you quit smoking.  Call [phone] or [phone] (toll free).

## 2023-11-28 NOTE — PROGRESS NOTES
Assessment & Plan     (I82.401) Recurrent acute deep vein thrombosis (DVT) of right lower extremity (H)  (primary encounter diagnosis)  Comment:    Given this second event of unprovoked significant blood clotting, you will need to remain on anticoagulation indefinitely.     Continue all other medications at the same doses.  Contact your usual pharmacy if you need refills.      Referral to vascular clinic to re-evaluate the DVT in the lower leg and consider if you need further treatment for remaining clot and to confirm that you need lifelong anticoagulation.      Continue all other medications at the same doses.  Contact your usual pharmacy if you need refills.      Lung cancer screening due to 20 pack year smoking history.  (To investigate for cause for the DVT)     Repeat colonoscopy in the spring due to the pre-cancerous polyp removed in 2015, but want to wait a few months before stopping the anticoagulation for a short period in case they need to do another biopsy or removed another polyp.      Plan: Vascular Medicine Referral, Lipid panel reflex         to direct LDL Fasting, Comprehensive metabolic         panel, CBC with platelets, Albumin Random Urine        Quantitative with Creat Ratio, DISCONTINUED:         apixaban ANTICOAGULANT (ELIQUIS) 5 MG tablet            (N18.31) Stage 3a chronic kidney disease (H)  Comment: This condition is currently controlled on the current medical regimen.  Continue current therapy.   Plan: Lipid panel reflex to direct LDL Fasting,         Comprehensive metabolic panel, CBC with         platelets, Albumin Random Urine Quantitative         with Creat Ratio, TSH with free T4 reflex            (Z87.891) Personal history of tobacco use  Comment: lung cancer screening  Plan: Prof fee: Shared Decision Making for Lung         Cancer Screening, CT Chest Lung Cancer Scrn Low        Dose wo            (E78.5) Hyperlipidemia LDL goal <160  Comment: This condition is currently  "controlled on the current medical regimen.  Continue current therapy.   Plan: Lipid panel reflex to direct LDL Fasting,         Comprehensive metabolic panel            (L72.3) Sebaceous cyst  Comment:      Referral to Dermatology clinic to evaluate the large sebaceous cysts on the back and side.       [facility]   [address]    [place] [address]   Phone: [phone]       Plan: Adult Dermatology  Referral            (E03.9) Hypothyroidism, unspecified type  Comment:   Plan: TSH with free T4 reflex            (Z12.5) Screening for prostate cancer  Comment:   Plan: Prostate Specific Antigen Screen                      MED REC REQUIRED  Post Medication Reconciliation Status: discharge medications reconciled, continue medications without change  BMI:   Estimated body mass index is 35.01 kg/m  as calculated from the following:    Height as of this encounter: 1.556 m (5' 1.25\").    Weight as of this encounter: 84.7 kg (186 lb 12.8 oz).          Return to see me in 4-5 months, sooner if needed.  Please get fasting labs done at the [facility] or any other [facility] lab 1-2 days before this appointment (schedule a \"lab appointment\").  If you get the labs done at another clinic, make arrangements with them directly.  The orders will be in place.  Eat nothing for at least 8 hours prior to having these labs drawn.  Use [email or web address] or Call [phone] to schedule the appointment with me and lab appointment.      Helio Menendez MD  [facility]            Steve Noonan is a 58 year old, presenting for the following health issues:  Hospital F/U        11/28/2023    12:55 PM   Additional Questions   Roomed by Eloisa PELAEZ CMA     [facility] [place]     ED/UC Followup:    Facility:  [facility]  Date of visit: 11-23-23  Reason for visit: Acute deep vein thrombosis (DVT) of femoral vein of right lower extremity (H) +1   Current " "Status: still having pain with walking a lot-gets numb when not moving sometimes-  Keeping elevated when sitting        HEMATOLOGIC HISTORY:  Mr. Saran Agudelo is a gentleman with deep vein thrombosis and pulmonary embolism    1.  Right lower extremity ultrasound was on 04/03/2014 for leg pain and swelling for 2 days revealed an occlusive DVT involving the mid to distal femoral vein extending into popliteal and tibioperoneal trunk.  The patient was unprovoked.   The patient was started on treatment with enoxaparin and warfarin.   2.  Because of hemoptysis, a CT chest was done on 04/05/2014.  It revealed pulmonary embolism involving the right upper lobe, right middle lobe, right lower lobe and left upper lobe.  There was suspicion of a small pulmonary infarct in the right middle lobe.     3.  Hypercoagulable workup was done on 04/05/2014.     -Lupus anticoagulant negative.   -Anticardiolipin IgG and IgM negative.   -Factor II negative.   -Factor V Leiden negative.   4.  CT of the abdomen and pelvis on 05/05/2014 did not reveal any evidence of malignancy.  There was question of a 0.7 cm filling defect in terminal ileum.   5.  For followup, CT of abdomen and pelvis was done on 11/07/2014.  There is some nodularity to the wall of the terminal ileum.  There is no evidence of mass or lymphadenopathy.  No evidence of malignancy.         **I reviewed the information recorded in the patient's EPIC chart (including but not limited to medical history, surgical history, family history, problem list, medication list, and allergy list) and updated the information as indicated based on the patients reported information.       Review of Systems   Constitutional, HEENT, cardiovascular, pulmonary, gi and gu systems are negative, except as otherwise noted.      Objective    /76   Pulse 72   Temp 98.7  F (37.1  C) (Tympanic)   Ht 1.556 m (5' 1.25\")   Wt 84.7 kg (186 lb 12.8 oz)   SpO2 99%   BMI 35.01 kg/m    Body mass " index is 35.01 kg/m .  Physical Exam   GENERAL alert and no distress  EYES:  Normal sclera,conjunctiva, EOMI  HENT: oral and posterior pharynx without lesions or erythema, facies symmetric  NECK: Neck supple. No LAD, without thyroidmegaly.  RESP: Clear to ausculation bilaterally without wheezes or crackles. Normal BS in all fields.  CV: RRR normal S1S2 without murmurs, rubs or gallops.  LYMPH: no cervical lymph adenopathy appreciated  MS: extremities- no gross deformities of the visible extremities noted,   EXT:  no lower extremity edema  PSYCH: Alert and oriented times 3; speech- coherent  SKIN:  No obvious significant skin lesions on visible portions of face                       Lung Cancer Screening Shared Decision Making Visit     Saran Agudelo, a 58 year old male, is eligible for lung cancer screening    History   Smoking Status    Former    Packs/day: 0.50    Types: Cigarettes    Start date: 10/20/1983    Quit date: 11/28/2023   Smokeless Tobacco    Never       I have discussed with patient the risks and benefits of screening for lung cancer with low-dose CT.     The risks include:    radiation exposure: one low dose chest CT has as much ionizing radiation as about 15 chest x-rays, or 6 months of background radiation living in [place]      false positives: most findings/nodules are NOT cancer, but some might still require additional diagnostic evaluation, including biopsy    over-diagnosis: some slow growing cancers that might never have been clinically significant will be detected and treated unnecessarily     The benefit of early detection of lung cancer is contingent upon adherence to annual screening or more frequent follow up if indicated.     Furthermore, to benefit from screening, Saran must be willing and able to undergo diagnostic procedures, if indicated. Although no specific guide is available for determining severity of comorbidities, it is reasonable to withhold screening in patients who  have greater mortality risk from other diseases.     We did discuss that the best way to prevent lung cancer is to not smoke.    Some patients may value a numeric estimation of lung cancer risk when evaluating if lung cancer screening is right for them, here is one calculator:    ShouldIScreen

## 2023-11-28 NOTE — TELEPHONE ENCOUNTER
Advised by patient that this is not related to work comp.    Future Appointments   Date Time Provider Department Center   11/29/2023 10:30 AM [facility]    11/30/2023  1:40 PM Dayday Sutherland MD [facility]

## 2023-11-29 ENCOUNTER — LAB (OUTPATIENT)
Dept: LAB | Facility: CLINIC | Age: 58
End: 2023-11-29
Payer: COMMERCIAL

## 2023-11-29 DIAGNOSIS — Z11.4 SCREENING FOR HIV (HUMAN IMMUNODEFICIENCY VIRUS): Primary | ICD-10-CM

## 2023-11-29 DIAGNOSIS — N18.31 STAGE 3A CHRONIC KIDNEY DISEASE (H): ICD-10-CM

## 2023-11-29 DIAGNOSIS — I82.401 RECURRENT ACUTE DEEP VEIN THROMBOSIS (DVT) OF RIGHT LOWER EXTREMITY (H): ICD-10-CM

## 2023-11-29 DIAGNOSIS — Z11.59 NEED FOR HEPATITIS C SCREENING TEST: ICD-10-CM

## 2023-11-29 LAB
D DIMER PPP FEU-MCNC: <0.27 UG/ML FEU (ref 0–0.5)
HCV AB SERPL QL IA: NONREACTIVE
HGB BLD-MCNC: 14.6 G/DL (ref 13.3–17.7)
HIV 1+2 AB+HIV1 P24 AG SERPL QL IA: NONREACTIVE

## 2023-11-29 PROCEDURE — 87389 HIV-1 AG W/HIV-1&-2 AB AG IA: CPT

## 2023-11-29 PROCEDURE — 36415 COLL VENOUS BLD VENIPUNCTURE: CPT

## 2023-11-29 PROCEDURE — 85018 HEMOGLOBIN: CPT

## 2023-11-29 PROCEDURE — 85379 FIBRIN DEGRADATION QUANT: CPT

## 2023-11-29 PROCEDURE — 86803 HEPATITIS C AB TEST: CPT

## 2023-11-30 ENCOUNTER — OFFICE VISIT (OUTPATIENT)
Dept: OTHER | Facility: CLINIC | Age: 58
End: 2023-11-30
Attending: INTERNAL MEDICINE
Payer: COMMERCIAL

## 2023-11-30 VITALS
HEART RATE: 62 BPM | BODY MASS INDEX: 27.03 KG/M2 | WEIGHT: 188.8 LBS | HEIGHT: 70 IN | SYSTOLIC BLOOD PRESSURE: 117 MMHG | OXYGEN SATURATION: 99 % | DIASTOLIC BLOOD PRESSURE: 74 MMHG

## 2023-11-30 DIAGNOSIS — I82.401 RECURRENT ACUTE DEEP VEIN THROMBOSIS (DVT) OF RIGHT LOWER EXTREMITY (H): ICD-10-CM

## 2023-11-30 PROCEDURE — 99205 OFFICE O/P NEW HI 60 MIN: CPT | Performed by: INTERNAL MEDICINE

## 2023-11-30 PROCEDURE — 99213 OFFICE O/P EST LOW 20 MIN: CPT | Performed by: INTERNAL MEDICINE

## 2023-11-30 NOTE — PROGRESS NOTES
INITIAL VASCULAR MEDICAL ASSESSMENT  REFERRAL SOURCE: Dr. Helio Menendez  REASON FOR CONSULT: Recurrent DVT    HPI: Saran Agudelo is a 58 year old male with a h/o RLE occlusive DVT involving the mid to distal femoral vein extending into the popliteal and tibioperoneal trunk in 2014 with concurrent BL PE without HD compromise for which he was ACd  with warfarin for thirteen months. He went off of AC at the direction of his hematologist around 13 months after diagnosis. He was then fine until recently.    On 11/23/23 he was seen in the [facility] ED with RLE pain. D dimer was normal. BLE venous duplex was interpreted as  acute deep vein thrombosis extending from the right distal femoral vein through the popliteal vein despite the normal d dimer. Of note, his 12/05/2017 RLE venous duplex revealed a small amount of nonocclusive chronic thrombus in the right popliteal vein, improved compared to the prior study of 4/32013. Accordingly, there had been an interval increase in thrombotic burden some time between 2017 and 2023, regardless of his currently normal d dimer.Therefore Dr. Donahue did discharge him with Eliquis 10 mg PO BID times seven days, then 5 mg PO BID thereafter.  He quit smoking on 11/23/23 after a 30 pack year tobacco use history.  He has not had any recent prolonged immobility or inactivity. No recent surgeries or prolonged trips. He has not recently had COVID. His last COVID vaccine or booster was > two months ago. He is not up to date on all age appropriate cancer screening. His PSA is supposed to be drawn prior to February, and his last colonoscopy was in 2015 and revealed polyps. I can not access the surgical pathology however. No respiratory compromise suggestive of PE.        Review Of Systems  Skin: negative  Eyes: negative  Ears/Nose/Throat: negative  Respiratory: No shortness of breath, dyspnea on exertion, cough, or hemoptysis  Cardiovascular: negative  Gastrointestinal:  negative  Genitourinary: negative  Musculoskeletal: negative  Neurologic: negative  Psychiatric: negative  Hematologic/Lymphatic/Immunologic: negative  Endocrine: negative      PAST MEDICAL HISTORY:                  Past Medical History:   Diagnosis Date    Hyperlipidemia LDL goal <160 04/11/2013    Hypothyroidism, unspecified 01/26/2023    Free T4 0.84, elevated TSH    Nephrolithiasis 04/23/2013       PAST SURGICAL HISTORY:                No past surgical history on file.    CURRENT MEDICATIONS:                  Current Outpatient Medications   Medication Sig Dispense Refill    amLODIPine (NORVASC) 2.5 MG tablet Take 1 tablet by mouth daily      [START ON 12/23/2023] apixaban ANTICOAGULANT (ELIQUIS) 5 MG tablet Take 1 tablet (5 mg) by mouth 2 times daily 60 tablet 11    Apixaban Starter Pack (ELIQUIS DVT/PE STARTER PACK) 5 MG TBPK Take 10 mg by mouth 2 times daily for 7 days, THEN 5 mg 2 times daily for 23 days. 74 each 0    hydrochlorothiazide (HYDRODIURIL) 12.5 MG tablet Take 1 tablet by mouth daily      levothyroxine (SYNTHROID/LEVOTHROID) 112 MCG tablet Take 1 tablet (112 mcg) by mouth daily 90 tablet 3    losartan (COZAAR) 25 MG tablet Take 1 tablet by mouth daily      reason aspirin not prescribed, intentional, Taking Eliquis anticoagulation         ALLERGIES:                  Allergies   Allergen Reactions    Bees Anaphylaxis    Compazine [Prochlorperazine]     Droperidol     Lisinopril Cough    Penicillins Hives       SOCIAL HISTORY:                  Social History     Socioeconomic History    Marital status: Single     Spouse name: Not on file    Number of children: Not on file    Years of education: Not on file    Highest education level: Not on file   Occupational History    Not on file   Tobacco Use    Smoking status: Former     Packs/day: .5     Types: Cigarettes     Start date: 10/20/1983     Quit date: 11/28/2023    Smokeless tobacco: Never    Tobacco comments:     4 days smoke free as of 11-28-23    Vaping Use    Vaping Use: Never used   Substance and Sexual Activity    Alcohol use: Not Currently     Alcohol/week: 0.0 standard drinks of alcohol     Comment: occasionally    Drug use: No    Sexual activity: Never   Other Topics Concern    Parent/sibling w/ CABG, MI or angioplasty before 65F 55M? Not Asked   Social History Narrative    Not on file     Social Determinants of Health     Financial Resource Strain: Low Risk  (11/28/2023)    Financial Resource Strain     Within the past 12 months, have you or your family members you live with been unable to get utilities (heat, electricity) when it was really needed?: No   Food Insecurity: Low Risk  (11/28/2023)    Food Insecurity     Within the past 12 months, did you worry that your food would run out before you got money to buy more?: No     Within the past 12 months, did the food you bought just not last and you didn t have money to get more?: No   Transportation Needs: Low Risk  (11/28/2023)    Transportation Needs     Within the past 12 months, has lack of transportation kept you from medical appointments, getting your medicines, non-medical meetings or appointments, work, or from getting things that you need?: No   Physical Activity: Not on file   Stress: Not on file   Social Connections: Not on file   Interpersonal Safety: Low Risk  (11/28/2023)    Interpersonal Safety     Do you feel physically and emotionally safe where you currently live?: Yes     Within the past 12 months, have you been hit, slapped, kicked or otherwise physically hurt by someone?: No     Within the past 12 months, have you been humiliated or emotionally abused in other ways by your partner or ex-partner?: No   Housing Stability: Low Risk  (11/28/2023)    Housing Stability     Do you have housing? : Yes     Are you worried about losing your housing?: No       FAMILY HISTORY:                   Family History   Problem Relation Age of Onset    Hypothyroidism Mother     C.A.D. Father      Hypertension Father     No Known Problems Sister     No Known Problems Sister     No Known Problems Brother          Physical exam Reveals:    O/P: WNL  HEENT: WNL  NECK: No JVD, thyromegaly, or lymphadenopathy  HEART: RRR, no murmurs, gallops, or rubs  LUNGS: CTA bilaterally without rales, wheezes, or rhonchi  GI: NABS, nondistended, nontender, soft  EXT:without cyanosis, clubbing, or edema  NEURO: nonfocal  : no flank tenderness                Component      Latest Ref Rng 11/29/2023  10:21 AM   D-Dimer Quantitative      0.00 - 0.50 ug/mL FEU <0.27    HIV Antigen Antibody Combo      Nonreactive  Nonreactive    Hepatitis C Antibody      Nonreactive  Nonreactive    Hemoglobin      13.3 - 17.7 g/dL 14.6        Component      Latest Ref Rng 4/5/2014  5:15 PM   Cardiolipin IgG Lary      0 - 15.0 GPL <15.0     Cardiolipin IgM Lary      0 - 12.5 MPL <12.5     Lupus Result      NEG  Negative             EXAM: US LOWER EXTREMITY VENOUS DUPLEX BILATERAL  LOCATION: [facility]  DATE: 11/23/2023     INDICATION: pain, dvt  COMPARISON: 12/05/2017  TECHNIQUE: Venous Duplex ultrasound of bilateral lower extremities with and without compression, augmentation and duplex. Color flow and spectral Doppler with waveform analysis performed.     FINDINGS: Exam includes the common femoral, femoral, popliteal veins as well as segmentally visualized deep calf veins and greater saphenous vein.      RIGHT: Nonocclusive deep vein thrombosis extending from the distal femoral vein through the popliteal vein. While the patient has had a deep vein thrombosis in this location, the current thrombus is central and either acute or acute on chronic. The   remaining deep venous structures are patent. No superficial thrombophlebitis. No popliteal cyst.     LEFT: No deep vein thrombosis. No superficial thrombophlebitis. No popliteal cyst.                                                                      IMPRESSION:  1.  Acute  deep vein thrombosis extending from the right distal femoral vein through the popliteal vein.        VENOUS ULTRASOUND RIGHT LEG  12/5/2017 11:15 AM      HISTORY:  Pain of right lower extremity.     COMPARISON: 4/3/2014.     FINDINGS:  Examination of the deep veins with graded compression and  color flow Doppler with spectral wave form analysis shows no evidence  of thrombus in the common femoral vein, femoral vein, or calf veins.  Minimal nonocclusive chronic thrombus is noted in the popliteal vein,  improved when compared to the prior study. No evidence of Baker's  cyst.                                                                      IMPRESSION: Small amount of nonocclusive chronic thrombus in the right  popliteal vein, improved compared to the prior study.       JOSE ALEJANDRO ROBERTSON, DO      CT CHEST AND PULMONARY EMBOLISM ANGIOGRAM  6/8/2015 8:19 AM      HISTORY: History of pulmonary embolus. Previous scan had revealed some  infarct and effusion. Follow it as patient wants to discontinue  anticoagulation, other pulmonary embolism and infarction.     COMPARISON: 4/5/2012.     TECHNIQUE: Volumetric helical acquisition of CT images of the chest  from the lung apices to the kidneys were acquired after the  administration of 75 mL Isovue-370  IV contrast.      FINDINGS: There is no pulmonary embolism. The previously seen  pulmonary emboli have resolved. Stable granulomatous changes. Lungs  are clear of infiltrate. Previously seen infiltrate versus infarct no  longer present. The heart is not enlarged.  Thoracic aorta is  unremarkable without evidence of dissection or aneurysm. There is no  pleural or pericardial effusion.  There is no pneumothorax. Adrenal  glands are normal.  Remainder of the visualized upper abdomen is  unremarkable.     IMPRESSION  IMPRESSION:   1. No pulmonary embolism demonstrated. Previously seen pulmonary  emboli have resolved.  2. No thoracic aortic dissection or aneurysm.     BROCK CASH  MD        CT CHEST PULMONARY EMBOLISM WITH CONTRAST  4/5/2014 12:56 PM    HISTORY:  Known DVT right leg.  Evaluate for pulmonary embolism.    TECHNIQUE:  Thin section axial images are performed from the thoracic  inlet to the lung bases utilizing 86 mL of Isovue 370 IV contrast  without adverse event.  Coronal reformatted images are also generated.    FINDINGS:    Chest:  Atelectatic lung base changes are noted bilaterally.  Calcified granulomas noted at the posterior left lung base.  Subpleural infiltrate or consolidation is present in the anterior  right middle lobe on image 93.  Trace right pleural fluid collection  is present.  No left pleural fluid or pericardial fluid.  Heart is  normal in size.  No enlarged lymph nodes.  Calcified left hilar lymph  nodes are present consistent with old granulomatous disease.  Right  lower lobe pulmonary emboli are present.  Left upper lobe embolism is  also noted in a peripheral pulmonary artery.  Proximal thrombus is  also noted in the right middle lobe pulmonary artery.  Thoracic aorta  is unremarkable.  Limited images upper abdomen are unremarkable.  Bone  window examination is within normal limits.    Impression   IMPRESSION:  1.  Pulmonary embolism involving the right upper lobe, right middle  lobe and right lower lobe in addition to a peripheral left upper lobe  pulmonary artery.  Area of subpleural consolidation anterior right  middle lobe may be a small pulmonary infarct.  Trace right pleural  effusion is also noted.  2.  Evidence of old granulomatous disease.    Dr. Gerard was contacted by me on 4/5/2014 at 1:15 PM regarding the  abnormal finding of pulmonary embolism and verbalized understanding of  the abnormal finding.    [Critical result: See report]    SALOMÓN GARCIA MD           ULTRASOUND RIGHT LOWER EXTREMITY VENOUS  4/3/2014 1:40 PM    HISTORY: Right calf pain and swelling.    COMPARISON: None.    TECHNIQUE: Grayscale and color Doppler sonographic imaging of  the  right lower extremity veins was performed with spectral waveform  analysis.    FINDINGS: The common femoral, proximal greater saphenous and  proximal-mid femoral vein are normally compressible with normal flow.  In the mid to distal femoral vein, echogenic intraluminal thrombus is  noted, and the vein becomes noncompressible, with no detectable flow.  This occlusive thrombus continues through the popliteal vein and  tibioperoneal trunk. The posterior tibial and peroneal veins however  appear patent with color flow. Thrombus is also seen in the  gastrocnemius veins.    Impression   IMPRESSION: Occlusive deep venous thrombosis involving the mid to  distal femoral vein extending into the popliteal and tibioperoneal  trunk.    Findings were discussed with Dr. Hollis by telephone by Dr. Chun at  2 PM on April 3, 2014.    YURI CHUN MD          A/P:      (I82.401) Recurrent acute deep vein thrombosis (DVT) of right lower extremity (H)  Comment: His first event was unprovoked. He has no Leiden mutation, ACLA , ATIII, LA based upon w/u in 2014. He went off of AC after a year. He then recently had another unprovoked event (he just quit smoking). He is not up to date on his cancer screening. Check CT C/A/P to rule out obvious masses. Check reaming thrombophilia labs as below. B2GP may be falsely positive on Eliquis. He will require lifelong AC as this is a recurrent event. It was likely subacute as d dimer has normalized after only one week on AC. Consider dose reduction at six months based upon interval data of d dimer and duplex to be ordered for May 2024 at next visit. Cancel lung cancer screening CT as he should be pan scanned. Wear thigh high compression hosiery.   Plan: CT Chest w Cont Abdomen Pelvis w/o & w Cont,         Antithrombin III, Beta 2 Glycoprotein 1         Antibody IgG, Beta 2 Glycoprotein 1 Antibody         IgM, Protein C chromogenic, Protein S Antigen         Free, Protein Immunofixation Serum,  Compression        Sleeve/Stocking Order for DME - ONLY FOR DME,         apixaban ANTICOAGULANT (ELIQUIS) 5 MG tablet            75 minutes total medical care on today's date

## 2023-11-30 NOTE — PROGRESS NOTES
"Patient is here to discuss consult    /71 (BP Location: Left arm, Patient Position: Chair, Cuff Size: Adult Large)   Pulse 62   Ht 5' 10\" (1.778 m)   Wt 188 lb 12.8 oz (85.6 kg)   SpO2 99%   BMI 27.09 kg/m      Questions patient would like addressed today are: N/A.    Refills are needed: No    Has homecare services and agency name:  [facility]    "

## 2023-12-01 ENCOUNTER — TELEPHONE (OUTPATIENT)
Dept: OTHER | Facility: CLINIC | Age: 58
End: 2023-12-01
Payer: COMMERCIAL

## 2023-12-01 NOTE — TELEPHONE ENCOUNTER
Follow-up to 11/30/23      PLEASE CANCEL CT LUNG 12/6/2023 and make sure patient is aware this is not necessary.  Non-fasting labs  CT Chest w Cont Abdomen Pelvis w/o & w Cont   In clinic visit 2+ weeks after labs, 1+ week after imaging.

## 2023-12-04 ENCOUNTER — LAB (OUTPATIENT)
Dept: LAB | Facility: CLINIC | Age: 58
End: 2023-12-04
Payer: COMMERCIAL

## 2023-12-04 DIAGNOSIS — I82.401 RECURRENT ACUTE DEEP VEIN THROMBOSIS (DVT) OF RIGHT LOWER EXTREMITY (H): ICD-10-CM

## 2023-12-04 PROCEDURE — 85300 ANTITHROMBIN III ACTIVITY: CPT

## 2023-12-04 PROCEDURE — 86334 IMMUNOFIX E-PHORESIS SERUM: CPT | Performed by: PATHOLOGY

## 2023-12-04 PROCEDURE — 85306 CLOT INHIBIT PROT S FREE: CPT

## 2023-12-04 PROCEDURE — 36415 COLL VENOUS BLD VENIPUNCTURE: CPT

## 2023-12-04 PROCEDURE — 85303 CLOT INHIBIT PROT C ACTIVITY: CPT

## 2023-12-04 PROCEDURE — 86146 BETA-2 GLYCOPROTEIN ANTIBODY: CPT

## 2023-12-05 LAB — PROT PATTERN SERPL IFE-IMP: NORMAL

## 2023-12-06 LAB
B2 GLYCOPROT1 IGG SERPL IA-ACNC: 1.9 U/ML
B2 GLYCOPROT1 IGM SERPL IA-ACNC: 3.4 U/ML

## 2023-12-07 LAB
AT III ACT/NOR PPP CHRO: 109 % (ref 85–135)
PROT C ACT/NOR PPP CHRO: 109 % (ref 70–170)
PROT S FREE AG ACT/NOR PPP IA: 107 % (ref 70–148)

## 2023-12-12 ENCOUNTER — HOSPITAL ENCOUNTER (OUTPATIENT)
Dept: CT IMAGING | Facility: CLINIC | Age: 58
Discharge: HOME | End: 2023-12-12
Attending: INTERNAL MEDICINE | Admitting: INTERNAL MEDICINE
Payer: COMMERCIAL

## 2023-12-12 DIAGNOSIS — I82.401 RECURRENT ACUTE DEEP VEIN THROMBOSIS (DVT) OF RIGHT LOWER EXTREMITY (H): ICD-10-CM

## 2023-12-12 LAB
CREAT BLD-MCNC: 1.9 MG/DL (ref 0.7–1.3)
EGFRCR SERPLBLD CKD-EPI 2021: 40 ML/MIN/1.73M2

## 2023-12-12 PROCEDURE — 82565 ASSAY OF CREATININE: CPT

## 2023-12-12 PROCEDURE — 250N000009 HC RX 250: Performed by: INTERNAL MEDICINE

## 2023-12-12 PROCEDURE — 250N000011 HC RX IP 250 OP 636: Performed by: INTERNAL MEDICINE

## 2023-12-12 PROCEDURE — 74177 CT ABD & PELVIS W/CONTRAST: CPT

## 2023-12-12 RX ORDER — IOPAMIDOL 755 MG/ML
92 INJECTION, SOLUTION INTRAVASCULAR ONCE
Status: COMPLETED | OUTPATIENT
Start: 2023-12-12 | End: 2023-12-12

## 2023-12-12 RX ADMIN — IOPAMIDOL 92 ML: 755 INJECTION, SOLUTION INTRAVENOUS at 06:55

## 2023-12-12 RX ADMIN — SODIUM CHLORIDE 66 ML: 9 INJECTION, SOLUTION INTRAVENOUS at 06:55

## 2023-12-18 ENCOUNTER — OFFICE VISIT (OUTPATIENT)
Dept: OTHER | Facility: CLINIC | Age: 58
End: 2023-12-18
Attending: INTERNAL MEDICINE
Payer: COMMERCIAL

## 2023-12-18 VITALS
OXYGEN SATURATION: 99 % | WEIGHT: 185.2 LBS | DIASTOLIC BLOOD PRESSURE: 78 MMHG | SYSTOLIC BLOOD PRESSURE: 128 MMHG | HEART RATE: 65 BPM | HEIGHT: 70 IN | BODY MASS INDEX: 26.51 KG/M2

## 2023-12-18 DIAGNOSIS — I82.401 RECURRENT ACUTE DEEP VEIN THROMBOSIS (DVT) OF RIGHT LOWER EXTREMITY (H): Primary | ICD-10-CM

## 2023-12-18 DIAGNOSIS — I10 ESSENTIAL HYPERTENSION: ICD-10-CM

## 2023-12-18 PROCEDURE — 99214 OFFICE O/P EST MOD 30 MIN: CPT | Performed by: INTERNAL MEDICINE

## 2023-12-18 PROCEDURE — 99213 OFFICE O/P EST LOW 20 MIN: CPT | Performed by: INTERNAL MEDICINE

## 2023-12-18 RX ORDER — HYDROCHLOROTHIAZIDE 12.5 MG/1
12.5 TABLET ORAL DAILY
Qty: 90 TABLET | Refills: 3 | Status: SHIPPED | OUTPATIENT
Start: 2023-12-18 | End: 2024-05-20

## 2023-12-18 NOTE — PROGRESS NOTES
"Patient is here to discuss follow up    BP (!) 132/93 (BP Location: Right arm, Patient Position: Chair, Cuff Size: Adult Regular)   Pulse 65   Ht 5' 10\" (1.778 m)   Wt 185 lb 3.2 oz (84 kg)   SpO2 99%   BMI 26.57 kg/m      Questions patient would like addressed today are: N/A.    Refills are needed: No    Has homecare services and agency name:  [employer] [facility]    "

## 2023-12-18 NOTE — PROGRESS NOTES
HPI: Saran Agudelo is a 58 year old male with a h/o RLE occlusive DVT involving the mid to distal femoral vein extending into the popliteal and tibioperoneal trunk in 2014 with concurrent BL PE without HD compromise for which he was ACd  with warfarin for thirteen months. He went off of AC at the direction of his hematologist around 13 months after diagnosis. He was then fine until recently.     On 11/23/23 he was seen in the [facility] ED with RLE pain. D dimer was normal. BLE venous duplex was interpreted as  acute deep vein thrombosis extending from the right distal femoral vein through the popliteal vein despite the normal d dimer. Of note, his 12/05/2017 RLE venous duplex revealed a small amount of nonocclusive chronic thrombus in the right popliteal vein, improved compared to the prior study of 4/32013. Accordingly, there had been an interval increase in thrombotic burden some time between 2017 and 2023, regardless of his currently normal d dimer.Therefore Dr. Donahue did discharge him with Eliquis 10 mg PO BID times seven days, then 5 mg PO BID thereafter.  He quit smoking on 11/23/23 after a 30 pack year tobacco use history.  He has not had any recent prolonged immobility or inactivity. No recent surgeries or prolonged trips. He has not recently had COVID. His last COVID vaccine or booster was > two months ago. He is not up to date on all age appropriate cancer screening. His PSA is supposed to be drawn prior to February, and his last colonoscopy was in 2015 and revealed polyps. I can not access the surgical pathology however. No respiratory compromise suggestive of PE.         Review Of Systems  Skin: negative  Eyes: negative  Ears/Nose/Throat: negative  Respiratory: No shortness of breath, dyspnea on exertion, cough, or hemoptysis  Cardiovascular: negative  Gastrointestinal: negative  Genitourinary: negative  Musculoskeletal: negative  Neurologic: negative  Psychiatric:  negative  Hematologic/Lymphatic/Immunologic: negative  Endocrine: negative        PAST MEDICAL HISTORY:                  Past Medical History        Past Medical History:   Diagnosis Date    Hyperlipidemia LDL goal <160 04/11/2013    Hypothyroidism, unspecified 01/26/2023     Free T4 0.84, elevated TSH    Nephrolithiasis 04/23/2013            PAST SURGICAL HISTORY:                  Past Surgical History   No past surgical history on file.        CURRENT MEDICATIONS:                  Current Outpatient Prescriptions          Current Outpatient Medications   Medication Sig Dispense Refill    amLODIPine (NORVASC) 2.5 MG tablet Take 1 tablet by mouth daily        [START ON 12/23/2023] apixaban ANTICOAGULANT (ELIQUIS) 5 MG tablet Take 1 tablet (5 mg) by mouth 2 times daily 60 tablet 11    Apixaban Starter Pack (ELIQUIS DVT/PE STARTER PACK) 5 MG TBPK Take 10 mg by mouth 2 times daily for 7 days, THEN 5 mg 2 times daily for 23 days. 74 each 0    hydrochlorothiazide (HYDRODIURIL) 12.5 MG tablet Take 1 tablet by mouth daily        levothyroxine (SYNTHROID/LEVOTHROID) 112 MCG tablet Take 1 tablet (112 mcg) by mouth daily 90 tablet 3    losartan (COZAAR) 25 MG tablet Take 1 tablet by mouth daily        reason aspirin not prescribed, intentional, Taking Eliquis anticoagulation                ALLERGIES:                       Allergies   Allergen Reactions    Bees Anaphylaxis    Compazine [Prochlorperazine]      Droperidol      Lisinopril Cough    Penicillins Hives         SOCIAL HISTORY:                  Social History   Social History            Socioeconomic History    Marital status: Single       Spouse name: Not on file    Number of children: Not on file    Years of education: Not on file    Highest education level: Not on file   Occupational History    Not on file   Tobacco Use    Smoking status: Former       Packs/day: .5       Types: Cigarettes       Start date: 10/20/1983       Quit date: 11/28/2023    Smokeless tobacco:  Never    Tobacco comments:       4 days smoke free as of 11-28-23   Vaping Use    Vaping Use: Never used   Substance and Sexual Activity    Alcohol use: Not Currently       Alcohol/week: 0.0 standard drinks of alcohol       Comment: occasionally    Drug use: No    Sexual activity: Never   Other Topics Concern    Parent/sibling w/ CABG, MI or angioplasty before 65F 55M? Not Asked   Social History Narrative    Not on file      Social Determinants of Health           Financial Resource Strain: Low Risk  (11/28/2023)     Financial Resource Strain      Within the past 12 months, have you or your family members you live with been unable to get utilities (heat, electricity) when it was really needed?: No   Food Insecurity: Low Risk  (11/28/2023)     Food Insecurity      Within the past 12 months, did you worry that your food would run out before you got money to buy more?: No      Within the past 12 months, did the food you bought just not last and you didn t have money to get more?: No   Transportation Needs: Low Risk  (11/28/2023)     Transportation Needs      Within the past 12 months, has lack of transportation kept you from medical appointments, getting your medicines, non-medical meetings or appointments, work, or from getting things that you need?: No   Physical Activity: Not on file   Stress: Not on file   Social Connections: Not on file   Interpersonal Safety: Low Risk  (11/28/2023)     Interpersonal Safety      Do you feel physically and emotionally safe where you currently live?: Yes      Within the past 12 months, have you been hit, slapped, kicked or otherwise physically hurt by someone?: No      Within the past 12 months, have you been humiliated or emotionally abused in other ways by your partner or ex-partner?: No   Housing Stability: Low Risk  (11/28/2023)     Housing Stability      Do you have housing? : Yes      Are you worried about losing your housing?: No            FAMILY HISTORY:                    Family History         Family History   Problem Relation Age of Onset    Hypothyroidism Mother      C.A.D. Father      Hypertension Father      No Known Problems Sister      No Known Problems Sister      No Known Problems Brother                 Physical exam Reveals:     O/P: WNL  HEENT: WNL  NECK: No JVD, thyromegaly, or lymphadenopathy  HEART: RRR, no murmurs, gallops, or rubs  LUNGS: CTA bilaterally without rales, wheezes, or rhonchi  GI: NABS, nondistended, nontender, soft  EXT:without cyanosis, clubbing, or edema  NEURO: nonfocal  : no flank tenderness                       Component      Latest Ref Rng 11/29/2023  10:21 AM   D-Dimer Quantitative      0.00 - 0.50 ug/mL FEU <0.27    HIV Antigen Antibody Combo      Nonreactive  Nonreactive    Hepatitis C Antibody      Nonreactive  Nonreactive    Hemoglobin      13.3 - 17.7 g/dL 14.6         Component      Latest Ref Rng 4/5/2014  5:15 PM   Cardiolipin IgG Lary      0 - 15.0 GPL <15.0     Cardiolipin IgM Lary      0 - 12.5 MPL <12.5     Lupus Result      NEG  Negative                EXAM: US LOWER EXTREMITY VENOUS DUPLEX BILATERAL  LOCATION: [facility]  DATE: 11/23/2023     INDICATION: pain, dvt  COMPARISON: 12/05/2017  TECHNIQUE: Venous Duplex ultrasound of bilateral lower extremities with and without compression, augmentation and duplex. Color flow and spectral Doppler with waveform analysis performed.     FINDINGS: Exam includes the common femoral, femoral, popliteal veins as well as segmentally visualized deep calf veins and greater saphenous vein.      RIGHT: Nonocclusive deep vein thrombosis extending from the distal femoral vein through the popliteal vein. While the patient has had a deep vein thrombosis in this location, the current thrombus is central and either acute or acute on chronic. The   remaining deep venous structures are patent. No superficial thrombophlebitis. No popliteal cyst.     LEFT: No deep vein thrombosis. No  superficial thrombophlebitis. No popliteal cyst.                                                                      IMPRESSION:  1.  Acute deep vein thrombosis extending from the right distal femoral vein through the popliteal vein.           VENOUS ULTRASOUND RIGHT LEG  12/5/2017 11:15 AM      HISTORY:  Pain of right lower extremity.     COMPARISON: 4/3/2014.     FINDINGS:  Examination of the deep veins with graded compression and  color flow Doppler with spectral wave form analysis shows no evidence  of thrombus in the common femoral vein, femoral vein, or calf veins.  Minimal nonocclusive chronic thrombus is noted in the popliteal vein,  improved when compared to the prior study. No evidence of Baker's  cyst.                                                                      IMPRESSION: Small amount of nonocclusive chronic thrombus in the right  popliteal vein, improved compared to the prior study.       JOSE ALEJANDRO ROBERTSON, DO        CT CHEST AND PULMONARY EMBOLISM ANGIOGRAM  6/8/2015 8:19 AM      HISTORY: History of pulmonary embolus. Previous scan had revealed some  infarct and effusion. Follow it as patient wants to discontinue  anticoagulation, other pulmonary embolism and infarction.     COMPARISON: 4/5/2012.     TECHNIQUE: Volumetric helical acquisition of CT images of the chest  from the lung apices to the kidneys were acquired after the  administration of 75 mL Isovue-370  IV contrast.      FINDINGS: There is no pulmonary embolism. The previously seen  pulmonary emboli have resolved. Stable granulomatous changes. Lungs  are clear of infiltrate. Previously seen infiltrate versus infarct no  longer present. The heart is not enlarged.  Thoracic aorta is  unremarkable without evidence of dissection or aneurysm. There is no  pleural or pericardial effusion.  There is no pneumothorax. Adrenal  glands are normal.  Remainder of the visualized upper abdomen is  unremarkable.     IMPRESSION  IMPRESSION:   1. No  pulmonary embolism demonstrated. Previously seen pulmonary  emboli have resolved.  2. No thoracic aortic dissection or aneurysm.     BROCK CASH MD           CT CHEST PULMONARY EMBOLISM WITH CONTRAST  4/5/2014 12:56 PM    HISTORY:  Known DVT right leg.  Evaluate for pulmonary embolism.    TECHNIQUE:  Thin section axial images are performed from the thoracic  inlet to the lung bases utilizing 86 mL of Isovue 370 IV contrast  without adverse event.  Coronal reformatted images are also generated.    FINDINGS:    Chest:  Atelectatic lung base changes are noted bilaterally.  Calcified granulomas noted at the posterior left lung base.  Subpleural infiltrate or consolidation is present in the anterior  right middle lobe on image 93.  Trace right pleural fluid collection  is present.  No left pleural fluid or pericardial fluid.  Heart is  normal in size.  No enlarged lymph nodes.  Calcified left hilar lymph  nodes are present consistent with old granulomatous disease.  Right  lower lobe pulmonary emboli are present.  Left upper lobe embolism is  also noted in a peripheral pulmonary artery.  Proximal thrombus is  also noted in the right middle lobe pulmonary artery.  Thoracic aorta  is unremarkable.  Limited images upper abdomen are unremarkable.  Bone  window examination is within normal limits.    Impression   IMPRESSION:  1.  Pulmonary embolism involving the right upper lobe, right middle  lobe and right lower lobe in addition to a peripheral left upper lobe  pulmonary artery.  Area of subpleural consolidation anterior right  middle lobe may be a small pulmonary infarct.  Trace right pleural  effusion is also noted.  2.  Evidence of old granulomatous disease.    Dr. Gerard was contacted by me on 4/5/2014 at 1:15 PM regarding the  abnormal finding of pulmonary embolism and verbalized understanding of  the abnormal finding.    [Critical result: See report]    SALOMÓN GARCIA MD               ULTRASOUND RIGHT LOWER  EXTREMITY VENOUS  4/3/2014 1:40 PM    HISTORY: Right calf pain and swelling.    COMPARISON: None.    TECHNIQUE: Grayscale and color Doppler sonographic imaging of the  right lower extremity veins was performed with spectral waveform  analysis.    FINDINGS: The common femoral, proximal greater saphenous and  proximal-mid femoral vein are normally compressible with normal flow.  In the mid to distal femoral vein, echogenic intraluminal thrombus is  noted, and the vein becomes noncompressible, with no detectable flow.  This occlusive thrombus continues through the popliteal vein and  tibioperoneal trunk. The posterior tibial and peroneal veins however  appear patent with color flow. Thrombus is also seen in the  gastrocnemius veins.    Impression   IMPRESSION: Occlusive deep venous thrombosis involving the mid to  distal femoral vein extending into the popliteal and tibioperoneal  trunk.    Findings were discussed with Dr. Hollis by telephone by Dr. Chun at  2 PM on April 3, 2014.    YURI CHUN MD       CT CHEST/ABDOMEN/PELVIS W CONTRAST 12/12/2023 7:10 AM     CLINICAL HISTORY: Recurrent acute deep vein thrombosis (DVT) of right  lower extremity (H)     TECHNIQUE: CT scan of the chest, abdomen, and pelvis was performed  following injection of IV contrast. Multiplanar reformats were  obtained. Dose reduction techniques were used.   CONTRAST: 92mL Isovue-370     COMPARISON: Chest CT 6/8/2015, CT abdomen and pelvis 11/7/2014     FINDINGS:   LUNGS AND PLEURA: Calcified granuloma left lower lobe. Lungs otherwise  clear. No pleural effusion.     MEDIASTINUM/AXILLAE: No lymphadenopathy. Mild coronary artery  calcification.     HEPATOBILIARY: Normal.     PANCREAS: Normal.     SPLEEN: Normal.     ADRENAL GLANDS: Normal.     KIDNEYS/BLADDER: Small renal cysts. Otherwise normal.     BOWEL: Normal.     PELVIC ORGANS: Normal.     ADDITIONAL FINDINGS: No compression of the IVC or iliac veins.     MUSCULOSKELETAL: Normal.                                                                       IMPRESSION:  1.  Negative CT of the chest, abdomen, and pelvis. No evidence of  malignancy.     ADILIA BEAN MD            A/P:        (I82.401) Recurrent acute deep vein thrombosis (DVT) of right lower extremity (H)  Comment: His first event was unprovoked. He has no Leiden mutation, ACLA , ATIII, LA based upon w/u in 2014. He went off of AC after a year. He then recently had another unprovoked event (although he had just quit smoking so one could consider this provoked in association with long standing tobacco use). He is not up to date on his cancer screening. We checked a CT C/A/P which ruled out obvious masses. We checked reaming thrombophilia labs (Antithrombin III, Beta 2 Glycoprotein 1 Antibody IgG, Beta 2 Glycoprotein 1 Antibody IgM, Protein C chromogenic, Protein S Antigen Free, Protein Immunofixation Serum), all of which were normal.  Cancel lung cancer screening CT as he should be pan scanned. Wear thigh high compression hosiery.   Plan: Continue to wear Compression stockings. He will require lifelong AC as this is a recurrent event. It was likely subacute as d dimer has normalized after only one week on AC. Consider dose reduction at six month adilia in May, 2024 based upon interval data of d dimer and duplex as ordered today for May 2024. RTC one week later.        apixaban ANTICOAGULANT (ELIQUIS) 5 MG tablet           32 minutes total medical care on today's date

## 2023-12-22 DIAGNOSIS — N18.31 STAGE 3A CHRONIC KIDNEY DISEASE (H): Primary | ICD-10-CM

## 2023-12-22 DIAGNOSIS — I10 BENIGN ESSENTIAL HYPERTENSION: ICD-10-CM

## 2023-12-22 NOTE — TELEPHONE ENCOUNTER
Medication Question or Refill        What medication are you calling about (include dose and sig)?: losatan 25mg     Preferred Pharmacy:   [facility] [address] [facility] [address] [address]  [address] [address]  Phone: [phone] Fax: [phone]    [id number] [place], [place] - [address] AT [facility] [address]  [address]  [facility] [address]  Phone: [phone] Fax: [phone]    [id number] [address] [facility]  [address] [facility] [address]  Phone: [phone] Fax: [phone]      Controlled Substance Agreement on file:   CSA -- Patient Level:    CSA: None found at the patient level.       Who prescribed the medication?:DR CHRISTENSEN     Do you need a refill? Yes    When did you use the medication last?  12/21/23    Patient offered an appointment? No    Do you have any questions or concerns?  No      Okay to leave a detailed message?: Yes at Cell number on file:    Telephone Information:   Mobile [phone]

## 2023-12-26 RX ORDER — LOSARTAN POTASSIUM 25 MG/1
25 TABLET ORAL DAILY
Qty: 90 TABLET | Refills: 1 | Status: SHIPPED | OUTPATIENT
Start: 2023-12-26 | End: 2024-05-20

## 2023-12-26 RX ORDER — AMLODIPINE BESYLATE 2.5 MG/1
2.5 TABLET ORAL DAILY
Qty: 90 TABLET | Refills: 1 | Status: SHIPPED | OUTPATIENT
Start: 2023-12-26 | End: 2024-05-20

## 2024-02-19 ENCOUNTER — TELEPHONE (OUTPATIENT)
Dept: OTHER | Facility: CLINIC | Age: 59
End: 2024-02-19
Payer: COMMERCIAL

## 2024-02-19 NOTE — TELEPHONE ENCOUNTER
Relayed to patient.    Lauren Arredondo RN BSN  [facility]  Phone: [phone]  Fax: [phone]

## 2024-02-19 NOTE — TELEPHONE ENCOUNTER
Anticoagulation Hold Order Request:    Hx:  recurrent DVT of RLE   LOV 12/18/2023    Medication and dose:  apixaban 5 mg BID (consider dose reduction in 5/2024 based on d-dimer & duplex done at that time)    Prescribed by: Dr. Sutherland    Procedure/Date: tooth removal 2/21/24

## 2024-02-19 NOTE — TELEPHONE ENCOUNTER
Pt had DVT in the last three months. We really don't like to interrupt AC in this time period. If we have to (as is the case herein) the AC should be interrupted as minimally as possible.       Eliquis only needs to be held for 24 hours preprocedure. If the dentist does not feel comfortable with this short of a hold, I would prefer it held for no more than 48 hours preprocedure.

## 2024-02-19 NOTE — TELEPHONE ENCOUNTER
[facility]    Who is the name of the provider?:  NORMA QUINTANA   What is the location you see this provider at/preferred location?: [place]  Person calling / Facility: Saran Agudelo  Phone number:  [phone] (home)  Nurse call back needed:  YES     Reason for call:  Patient stopped by , patient scheduled for dental appt on 02/21/24. Patient was flossing teeth and chipped/broke two teeth this morning. Patient is certain he would need those teeth removed and has concerns due to being on blood thinners. Patient would like RN to contact him to discuss the potential risk of blood thinners and oral surgery.    Pharmacy location: n/a  Outside Imaging: n/a   Can we leave a detailed message on this number?  YES     2/19/2024, 1:42 PM

## 2024-03-18 ENCOUNTER — TELEPHONE (OUTPATIENT)
Dept: INTERNAL MEDICINE | Facility: CLINIC | Age: 59
End: 2024-03-18
Payer: COMMERCIAL

## 2024-03-18 NOTE — TELEPHONE ENCOUNTER
FYI: pt reports taking three tabs daily of losartan until today.   He thinks what happened is that he transferred the new prescription bottle to the old prescription bottle and was following the old sig so he was still taking three tablets instead of the prescribed 1 tablet daily since 12/26/23.     Pt is asymptomatic.     Reviewed current rx with pt;    losartan (COZAAR) 25 MG tablet  25 mg, DAILY           Summary: Take 1 tablet (25 mg) by mouth daily, Disp-90 tablet, R-1, E-Prescribe  Dose, Route, Frequency: 25 mg, Oral, DAILY        Pt verbalizes understanding and agrees to plan of care.     Routing to provider. Please review and advise. Labs?

## 2024-03-20 NOTE — TELEPHONE ENCOUNTER
"Just take the losartan 1 tablet daily as prescribed.  Continue all other medications at the same doses.  Contact your usual pharmacy if you need refills.     Return to see me in approximately 2 months for an annual physical and recheck on blood pressure, retunr sooner if needed.  [facility] 1-2 days before this appointment (make a  \"lab appointment\").  Eat nothing for at least 8 hours prior to having these labs drawn.  Use [employer] or Call [phone] to schedule the appointment with me and lab appointment.     Close encounter when done.   "

## 2024-03-21 NOTE — TELEPHONE ENCOUNTER
Relayed provider message. Pt verbalizes understanding and agrees to plan of care. Lab and office visit scheduled.

## 2024-05-13 ENCOUNTER — HOSPITAL ENCOUNTER (OUTPATIENT)
Dept: ULTRASOUND IMAGING | Facility: CLINIC | Age: 59
Discharge: HOME | End: 2024-05-13
Attending: INTERNAL MEDICINE | Admitting: INTERNAL MEDICINE
Payer: COMMERCIAL

## 2024-05-13 DIAGNOSIS — I82.401 RECURRENT ACUTE DEEP VEIN THROMBOSIS (DVT) OF RIGHT LOWER EXTREMITY (H): ICD-10-CM

## 2024-05-13 PROCEDURE — 93971 EXTREMITY STUDY: CPT | Mod: RT

## 2024-05-17 ENCOUNTER — LAB (OUTPATIENT)
Dept: LAB | Facility: CLINIC | Age: 59
End: 2024-05-17
Payer: COMMERCIAL

## 2024-05-17 DIAGNOSIS — I82.401 RECURRENT ACUTE DEEP VEIN THROMBOSIS (DVT) OF RIGHT LOWER EXTREMITY (H): ICD-10-CM

## 2024-05-17 DIAGNOSIS — N18.31 STAGE 3A CHRONIC KIDNEY DISEASE (H): ICD-10-CM

## 2024-05-17 DIAGNOSIS — E78.5 HYPERLIPIDEMIA LDL GOAL <160: ICD-10-CM

## 2024-05-17 DIAGNOSIS — Z12.5 SCREENING FOR PROSTATE CANCER: ICD-10-CM

## 2024-05-17 DIAGNOSIS — E03.9 HYPOTHYROIDISM, UNSPECIFIED TYPE: ICD-10-CM

## 2024-05-17 LAB
D DIMER PPP FEU-MCNC: <0.27 UG/ML FEU (ref 0–0.5)
ERYTHROCYTE [DISTWIDTH] IN BLOOD BY AUTOMATED COUNT: 12 % (ref 10–15)
HCT VFR BLD AUTO: 38.4 % (ref 40–53)
HGB BLD-MCNC: 13.3 G/DL (ref 13.3–17.7)
MCH RBC QN AUTO: 31.7 PG (ref 26.5–33)
MCHC RBC AUTO-ENTMCNC: 34.6 G/DL (ref 31.5–36.5)
MCV RBC AUTO: 92 FL (ref 78–100)
PLATELET # BLD AUTO: 162 10E3/UL (ref 150–450)
RBC # BLD AUTO: 4.19 10E6/UL (ref 4.4–5.9)
WBC # BLD AUTO: 5.2 10E3/UL (ref 4–11)

## 2024-05-17 PROCEDURE — 80061 LIPID PANEL: CPT

## 2024-05-17 PROCEDURE — 82043 UR ALBUMIN QUANTITATIVE: CPT

## 2024-05-17 PROCEDURE — 36415 COLL VENOUS BLD VENIPUNCTURE: CPT

## 2024-05-17 PROCEDURE — G0103 PSA SCREENING: HCPCS

## 2024-05-17 PROCEDURE — 82570 ASSAY OF URINE CREATININE: CPT

## 2024-05-17 PROCEDURE — 85027 COMPLETE CBC AUTOMATED: CPT

## 2024-05-17 PROCEDURE — 84443 ASSAY THYROID STIM HORMONE: CPT

## 2024-05-17 PROCEDURE — 80053 COMPREHEN METABOLIC PANEL: CPT

## 2024-05-17 PROCEDURE — 85379 FIBRIN DEGRADATION QUANT: CPT

## 2024-05-18 LAB
ALBUMIN SERPL BCG-MCNC: 4.4 G/DL (ref 3.5–5.2)
ALP SERPL-CCNC: 70 U/L (ref 40–150)
ALT SERPL W P-5'-P-CCNC: 15 U/L (ref 0–70)
ANION GAP SERPL CALCULATED.3IONS-SCNC: 10 MMOL/L (ref 7–15)
AST SERPL W P-5'-P-CCNC: 18 U/L (ref 0–45)
BILIRUB SERPL-MCNC: 0.6 MG/DL
BUN SERPL-MCNC: 37.2 MG/DL (ref 6–20)
CALCIUM SERPL-MCNC: 9.5 MG/DL (ref 8.6–10)
CHLORIDE SERPL-SCNC: 101 MMOL/L (ref 98–107)
CHOLEST SERPL-MCNC: 134 MG/DL
CREAT SERPL-MCNC: 1.85 MG/DL (ref 0.67–1.17)
CREAT UR-MCNC: 118 MG/DL
DEPRECATED HCO3 PLAS-SCNC: 27 MMOL/L (ref 22–29)
EGFRCR SERPLBLD CKD-EPI 2021: 42 ML/MIN/1.73M2
FASTING STATUS PATIENT QL REPORTED: YES
FASTING STATUS PATIENT QL REPORTED: YES
GLUCOSE SERPL-MCNC: 92 MG/DL (ref 70–99)
HDLC SERPL-MCNC: 39 MG/DL
LDLC SERPL CALC-MCNC: 76 MG/DL
MICROALBUMIN UR-MCNC: <12 MG/L
MICROALBUMIN/CREAT UR: NORMAL MG/G{CREAT}
NONHDLC SERPL-MCNC: 95 MG/DL
POTASSIUM SERPL-SCNC: 4.6 MMOL/L (ref 3.4–5.3)
PROT SERPL-MCNC: 7.1 G/DL (ref 6.4–8.3)
PSA SERPL DL<=0.01 NG/ML-MCNC: 1.24 NG/ML (ref 0–3.5)
SODIUM SERPL-SCNC: 138 MMOL/L (ref 135–145)
TRIGL SERPL-MCNC: 97 MG/DL
TSH SERPL DL<=0.005 MIU/L-ACNC: 2.45 UIU/ML (ref 0.3–4.2)

## 2024-05-20 ENCOUNTER — OFFICE VISIT (OUTPATIENT)
Dept: INTERNAL MEDICINE | Facility: CLINIC | Age: 59
End: 2024-05-20
Payer: COMMERCIAL

## 2024-05-20 VITALS
DIASTOLIC BLOOD PRESSURE: 74 MMHG | OXYGEN SATURATION: 99 % | BODY MASS INDEX: 26.83 KG/M2 | RESPIRATION RATE: 18 BRPM | TEMPERATURE: 99.3 F | HEART RATE: 72 BPM | WEIGHT: 187.4 LBS | HEIGHT: 70 IN | SYSTOLIC BLOOD PRESSURE: 122 MMHG

## 2024-05-20 DIAGNOSIS — I10 ESSENTIAL HYPERTENSION: ICD-10-CM

## 2024-05-20 DIAGNOSIS — Z00.01 ENCOUNTER FOR ROUTINE ADULT MEDICAL EXAM WITH ABNORMAL FINDINGS: Primary | ICD-10-CM

## 2024-05-20 DIAGNOSIS — D12.6 TUBULAR ADENOMA OF COLON: ICD-10-CM

## 2024-05-20 DIAGNOSIS — N18.31 STAGE 3A CHRONIC KIDNEY DISEASE (H): ICD-10-CM

## 2024-05-20 DIAGNOSIS — I82.401 RECURRENT ACUTE DEEP VEIN THROMBOSIS (DVT) OF RIGHT LOWER EXTREMITY (H): ICD-10-CM

## 2024-05-20 DIAGNOSIS — E03.9 HYPOTHYROIDISM, UNSPECIFIED TYPE: ICD-10-CM

## 2024-05-20 DIAGNOSIS — I10 BENIGN ESSENTIAL HYPERTENSION: ICD-10-CM

## 2024-05-20 DIAGNOSIS — M70.22 OLECRANON BURSITIS OF LEFT ELBOW: ICD-10-CM

## 2024-05-20 DIAGNOSIS — L72.3 INFLAMED SEBACEOUS CYST: ICD-10-CM

## 2024-05-20 DIAGNOSIS — Z12.11 SCREEN FOR COLON CANCER: ICD-10-CM

## 2024-05-20 PROCEDURE — 99396 PREV VISIT EST AGE 40-64: CPT | Performed by: INTERNAL MEDICINE

## 2024-05-20 PROCEDURE — 99214 OFFICE O/P EST MOD 30 MIN: CPT | Mod: 25 | Performed by: INTERNAL MEDICINE

## 2024-05-20 RX ORDER — LOSARTAN POTASSIUM 25 MG/1
25 TABLET ORAL DAILY
Qty: 90 TABLET | Refills: 3 | Status: SHIPPED | OUTPATIENT
Start: 2024-05-20

## 2024-05-20 RX ORDER — AMLODIPINE BESYLATE 2.5 MG/1
2.5 TABLET ORAL DAILY
Qty: 90 TABLET | Refills: 3 | Status: SHIPPED | OUTPATIENT
Start: 2024-05-20

## 2024-05-20 RX ORDER — HYDROCHLOROTHIAZIDE 12.5 MG/1
12.5 TABLET ORAL DAILY
Qty: 90 TABLET | Refills: 3 | Status: SHIPPED | OUTPATIENT
Start: 2024-05-20

## 2024-05-20 RX ORDER — LEVOTHYROXINE SODIUM 112 UG/1
112 TABLET ORAL DAILY
Qty: 90 TABLET | Refills: 3 | Status: SHIPPED | OUTPATIENT
Start: 2024-05-20

## 2024-05-20 SDOH — HEALTH STABILITY: PHYSICAL HEALTH: ON AVERAGE, HOW MANY MINUTES DO YOU ENGAGE IN EXERCISE AT THIS LEVEL?: 90 MIN

## 2024-05-20 SDOH — HEALTH STABILITY: PHYSICAL HEALTH: ON AVERAGE, HOW MANY DAYS PER WEEK DO YOU ENGAGE IN MODERATE TO STRENUOUS EXERCISE (LIKE A BRISK WALK)?: 5 DAYS

## 2024-05-20 ASSESSMENT — SOCIAL DETERMINANTS OF HEALTH (SDOH): HOW OFTEN DO YOU GET TOGETHER WITH FRIENDS OR RELATIVES?: ONCE A WEEK

## 2024-05-20 ASSESSMENT — PAIN SCALES - GENERAL: PAINLEVEL: NO PAIN (0)

## 2024-05-20 NOTE — PROGRESS NOTES
Preventive Care Visit  [facility]  Helio Menendez MD, Internal Medicine  May 20, 2024      Assessment & Plan     (Z00.01) Encounter for routine adult medical exam with abnormal findings  (primary encounter diagnosis)  Comment: Discussed cardiac disease risk factor modification including screening, preventing, and treating hypertension, elevated lipids, diabetes, and smoking cessation.    Discussed age appropriate cancer screening recommendations as dictated by age group and past medical history.    Recommended making better food choices as often as possible, including lower carb, lower fat, lower salt diet and moderation in any alcohol intake.    Recommended maintaining regular physical activity/exercise throughout their lifetime.  Recommended safety and injury prevention (i.e. seatbelt use, safety equipment like helmets when biking, etc).    Reviewed preventive health counseling, as reflected in patient instructions       Regular exercise       Healthy diet/nutrition       Vision screening       Hearing screening       Immunizations  Strongly recommended COVID vaccination booster update, but the patient declined.  Recommended he investigate Shingrix shingles vaccine series, check with the pharmacist further cost and coverage.  Plan to get the annual influenza vaccine each fall for sure by the end of October for the best protection throughout the winter flu season.   Get this from any pharmacy or flu shot clinic.    Plan to get an updated Covid booster each fall at least by the end of October for the best protection throughout the winter season.   Get this from any pharmacy or Covid vaccine clinic.            Colorectal cancer screening       Prostate cancer screening   Plan:     (N18.31) Stage 3a chronic kidney disease (H)  Comment: This condition is currently controlled on the current medical regimen.  Continue current therapy.   Avoid nephrotoxins as best possible  Plan: losartan  (COZAAR) 25 MG tablet, amLODIPine         (NORVASC) 2.5 MG tablet            (I10) Benign essential hypertension  Comment: This condition is currently controlled on the current medical regimen.  Continue current therapy.   Plan: losartan (COZAAR) 25 MG tablet, amLODIPine         (NORVASC) 2.5 MG tablet            (E03.9) Hypothyroidism, unspecified type  Comment: This condition is currently controlled on the current medical regimen.  Continue current therapy.   Plan: levothyroxine (SYNTHROID/LEVOTHROID) 112 MCG         tablet            (I10) Essential hypertension  Comment: This condition is currently controlled on the current medical regimen.  Continue current therapy.   Plan: hydroCHLOROthiazide 12.5 MG tablet            (M70.22) Olecranon bursitis of left elbow  Comment: Asymptomatic prominent left olecranon bursa.  Does not interfere with daily activities, is not painful.  Offered referral to orthopedist to review his current situation, however doubt that any surgical procedure would be indicated without any significant symptoms.  Plan:     (I82.401) Recurrent acute deep vein thrombosis (DVT) of right lower extremity (H)  Comment: Unfortunately this is the second lifetime event for him, he will need lifetime anticoagulation.  Continue follow-up with vascular medicine as ordered.  Plan: apixaban ANTICOAGULANT (ELIQUIS) 5 MG tablet            (L72.3) Inflamed sebaceous cyst  Comment: He will be having his inflamed cyst excised in the coming weeks by the dermatologist.  Plan:     (Z12.11) Screen for colon cancer  Comment: Reviewed current colon cancer screening guidelines.  Patient had a previous tubular adenoma removed in 2015 as left screening colonoscopy.  Was recommended return every 5 years for another colonoscopy.  This is reordered today.  Hold the Eliquis anticoagulation for 2 days (4 doses) in case a biopsy is needed.  Plan: Colonoscopy Screening  Referral            (D12.6) Tubular adenoma of  "colon  Comment: Previous single tubular adenoma removed during his last colonoscopy 2015.  He was told to return for another repeat colonoscopy a few years ago, but has not been back since then.  Colonoscopy ordered  Plan: Colonoscopy Screening  Referral               Patient has been advised of split billing requirements and indicates understanding: Yes        BMI  Estimated body mass index is 26.89 kg/m  as calculated from the following:    Height as of this encounter: 1.778 m (5' 10\").    Weight as of this encounter: 85 kg (187 lb 6.4 oz).       Counseling  Appropriate preventive services were discussed with this patient, including applicable screening as appropriate for fall prevention, nutrition, physical activity, Tobacco-use cessation, weight loss and cognition.  Checklist reviewing preventive services available has been given to the patient.  Reviewed patient's diet, addressing concerns and/or questions.   He is at risk for psychosocial distress and has been provided with information to reduce risk.           Steve Noonan is a 58 year old, presenting for the following:  Physical        5/20/2024    10:59 AM   Additional Questions   Roomed by Eloisa PELAEZ CMA        Health Care Directive  Patient does not have a Health Care Directive or Living Will:     HPI      1.) history of 2 pulmonary embolism events.  Currently maintained on Eliquis without difficulty.  Reports no side effects or symptoms.  Follow-up with vascular medicine noted..)      2.)  Hypertension:  History of hypertension, on medication.  No reported side effects from medications.    Reviewed last 6 BP readings in chart:  BP Readings from Last 6 Encounters:   05/20/24 122/74   12/18/23 128/78   11/30/23 117/74   11/28/23 130/76   11/23/23 126/85   11/02/23 123/77     No active cardiac complaints or symptoms with exercise.       3.)  Inflamed sebaceous cyst in his upper right back, to be excised at a dermatology office in the coming " weeks.      4.)  Prominent left olecranon bursa since a traumatic injury in this area several years ago.  It is not inflamed, not painful not draining.    5.)  History of chronic kidney disease presumed due to HTN.  Reviewed most recent labs:    Lab Results   Component Value Date    CR 1.85 05/17/2024    CR 1.9 12/12/2023    CR 1.81 08/07/2023    CR 1.64 01/19/2023    CR 1.38 01/07/2023    CR 1.51 12/02/2014    CR 1.57 10/02/2014    CR 1.57 06/24/2014    CR 1.35 04/07/2014    CR 1.37 04/06/2014    CR 1.54 04/05/2014    CR 1.66 04/03/2014    CR 1.42 04/16/2013    CR 1.36 12/23/2009 5/20/2024   General Health   How would you rate your overall physical health? Good   Feel stress (tense, anxious, or unable to sleep) Only a little   (!) STRESS CONCERN      5/20/2024   Nutrition   Three or more servings of calcium each day? Yes   Diet: Low salt    Carbohydrate counting    Gluten-free/reduced   How many servings of fruit and vegetables per day? (!) 2-3   How many sweetened beverages each day? 0-1         5/20/2024   Exercise   Days per week of moderate/strenous exercise 5 days   Average minutes spent exercising at this level 90 min         5/20/2024   Social Factors   Frequency of gathering with friends or relatives Once a week   Worry food won't last until get money to buy more No   Food not last or not have enough money for food? No   Do you have housing?  Yes   Are you worried about losing your housing? No   Lack of transportation? No   Unable to get utilities (heat,electricity)? No         5/20/2024   Fall Risk   Fallen 2 or more times in the past year? No   Trouble with walking or balance? No          5/20/2024   Dental   Dentist two times every year? Yes         5/20/2024   TB Screening   Were you born outside of the [place]? No         Today's PHQ-2 Score:       5/20/2024    10:28 AM   PHQ-2 ( 1999 [employer])   Q1: Little interest or pleasure in doing things 0   Q2: Feeling down, depressed or hopeless 0    PHQ-2 Score 0   Q1: Little interest or pleasure in doing things Not at all   Q2: Feeling down, depressed or hopeless Not at all   PHQ-2 Score 0           2024   Substance Use   Alcohol more than 3/day or more than 7/wk No   Do you use any other substances recreationally? No     Social History     Tobacco Use    Smoking status: Former     Current packs/day: 0.00     Average packs/day: 0.5 packs/day for 40.1 years (20.1 ttl pk-yrs)     Types: Cigarettes     Start date: 10/20/1983     Quit date: 2023     Years since quittin.4    Smokeless tobacco: Never    Tobacco comments:     4 days smoke free as of 23   Vaping Use    Vaping status: Never Used   Substance Use Topics    Alcohol use: Not Currently     Alcohol/week: 0.0 standard drinks of alcohol     Comment: occasionally    Drug use: No           2024   STI Screening   New sexual partner(s) since last STI/HIV test? No   Last PSA:   Prostate Specific Antigen Screen   Date Value Ref Range Status   2024 1.24 0.00 - 3.50 ng/mL Final     ASCVD Risk   The 10-year ASCVD risk score (Shmuel IRAHETA, et al., 2019) is: 6.4%    Values used to calculate the score:      Age: 58 years      Sex: Male      Is Non- : No      Diabetic: No      Tobacco smoker: No      Systolic Blood Pressure: 122 mmHg      Is BP treated: Yes      HDL Cholesterol: 39 mg/dL      Total Cholesterol: 134 mg/dL           Reviewed and updated as needed this visit by Provider         Blaze Saab            **I reviewed the information recorded in the patient's EPIC chart (including but not limited to medical history, surgical history, family history, problem list, medication list, and allergy list) and updated the information as indicated based on the patients reported information.         Review of Systems  Constitutional, neuro, ENT, endocrine, pulmonary, cardiac, gastrointestinal, genitourinary, musculoskeletal, integument and psychiatric systems are  "negative, except as otherwise noted.     Objective    Exam  /74   Pulse 72   Temp 99.3  F (37.4  C) (Tympanic)   Resp 18   Ht 1.778 m (5' 10\")   Wt 85 kg (187 lb 6.4 oz)   SpO2 99%   BMI 26.89 kg/m     Estimated body mass index is 26.89 kg/m  as calculated from the following:    Height as of this encounter: 1.778 m (5' 10\").    Weight as of this encounter: 85 kg (187 lb 6.4 oz).    Physical Exam  GENERAL alert and no distress  EYES:  Normal sclera,conjunctiva, EOMI  HENT: oral and posterior pharynx without lesions or erythema, facies symmetric  NECK: Neck supple. No LAD, without thyroidmegaly.  RESP: Clear to ausculation bilaterally without wheezes or crackles. Normal BS in all fields.  CV: RRR normal S1S2 without murmurs, rubs or gallops.  LYMPH: no cervical lymph adenopathy appreciated  MS: extremities- no gross deformities of the visible extremities noted, other than a prominent left olecranon bursa that is nontender, noninflamed nonerythematous.  EXT:  no lower extremity edema  PSYCH: Alert and oriented times 3; speech- coherent  SKIN:  No obvious significant skin lesions on visible portions of face   Large inflamed sebaceous cyst in the right upper shoulder area.  It drained small amounts of purulent debris  He has many subcutaneous rubbery nodular lumps in the subcutaneous tissue consistent with previous neurofibromatosis diagnosis.        Signed Electronically by: Helio Menendez MD    "

## 2024-05-20 NOTE — PATIENT INSTRUCTIONS
" Due for colonoscopy for routine colon cancer screening due to the prior pre-cancerous colon polyp removed in 2015 (then you need a colonoscopy)     --[facility] [phone] (fax [phone])  will contact you to arrange.      Stop the anticoagulation medication for 2 days (4 doses) before the colonoscopy so they can take a biopsy if they need to.  (otherwise you would need to come back for another colonoscopy procedure)     Investigate a Shingrix shingles vaccine with your pharmacist .  They can tell you the coverage and cost and then give it to you if the price is acceptable.    Medicare sometimes does not cover these Shingrix shingles vaccines, and with Medicare insurance it is usually cheaper to receive this shingles vaccine from a pharmacist in a pharmacy rather than in our clinic.    At this time, you only need the 2 Shingrix vaccines and then you are done.      Plan to get the annual influenza vaccine each fall for sure by the end of October for the best protection throughout the winter flu season.   Get this from any pharmacy or flu shot clinic.       Plan to get an updated Covid booster each fall at least by the end of October for the best protection throughout the winter season.   Get this from any pharmacy or Covid vaccine clinic.          Continue all medications at the same doses.  Contact your usual pharmacy if you need refills.      Follow up as planned with the vascular medicine doctor.         5 GOALS TO PREVENT VASCULAR DISEASE:     1.  Aggressive blood pressure control, under 130/80 ideally.  Using medications if needed.    Your blood pressure is under good control    BP Readings from Last 4 Encounters:   05/20/24 122/74   12/18/23 128/78   11/30/23 117/74   11/28/23 130/76       2.  Aggressive LDL cholesterol (\"bad cholesterol\") lowering as indicated.    Your goal is an LDL under 130 for sure, preferably under 100.  (If you have diabetes or previous vascular disease, the the LDL " goals would be under 100 for sure, preferably under 70.)    New guidelines identify four high-risk groups who could benefit from statins:   *people with pre-existing heart disease, such as those who have had a heart attack;   *people ages 40 to 75 who have diabetes of any type  *patients ages 40 to 75 with at least a 7.5% risk of developing cardiovascular disease over the next decade, according to a formula described in the guidelines  *patients with the sort of super-high cholesterol that sometimes runs in families, as evidenced by an LDL of 190 milligrams per deciliter or higher    Your cholesterol levels are well controlled.    Recent Labs   Lab Test 05/17/24  1436 01/19/23  1624   CHOL 134 136   HDL 39* 39*   LDL 76 80   TRIG 97 85       3.  Aggressive diabetic prevention, screening and/or management.      You do not have diabetes as of the most recent blood tests.     4.  No smoking    5.  Consider daily preventative aspirin over age 50 if you have enough cardiac risk factors to place you at higher risk for the presence of vascular disease.    If you have any reason not to take aspirin such easy bruising or bleeding, stomach problems, other anticoagulant medications, or any other side effects, then you should not take Aspirin.     --Based on prior side effects from aspirin or other medications for which aspirin would cause potential problems, you should NOT take daily preventative aspirin.        Preventive Health Recommendations  Male Ages 45 - 64    Yearly exam:             See your health care provider every year in order to  o   Review health changes.   o   Discuss preventive care.    o   Review your medicines if your doctor has prescribed any.   Continue to review and reassess the management of any ongoing chronic medical conditions  Have a cholesterol test every 5 years, or more frequently if you are at risk for high cholesterol/heart disease.   Have a diabetes test (fasting glucose) at least every three  years. If you are at risk for diabetes, you should have this test more often.   Regular colon cancer screening starting age 45 with either a colonoscopy, or stool test (either Cologuard every 3 years or yearly FIT stool test from us).  The intervals for colon cancer screening will be determined by family history and prior colon cancer screening results.   Routine prostate cancer screening with a PSA blood test every 1-2 years.   While the PSA blood test is not a direct cancer screening blood test, it detects inflammation within in the prostate, which could indicate possible cancer.  If the test is abnormal, you do not necessarily have prostate cancer, but would need further evaluation.    You should be tested each year for STDs (sexually transmitted diseases), if you re at risk.     Shots:   Get a flu shot each year.   Covid vaccines are now recommended annually.  Get the most updated Covid vaccine when it becomes available, consider getting this at the same time as the annual influenza vaccine.   Get a tetanus shot every 10 years.  Everyone over age 50 should strongly consider the Shingrix shingles vaccine to give you the best chance of avoiding future shingles infection (as many as 1 and 3 adults over age 50 may develop this condition in their lifetime).    Investigate the cost and coverage for Shingrix shingles vaccines with a pharmacist at a pharmacy.  They can tell you the coverage and cost and then give it to you if the price is acceptable.    In case your insurance does not cover a Shingrix shingles vaccine, it is cheaper to receive this shingles vaccine from a pharmacist in a pharmacy rather than in our clinic.    At this time, you only need the 2 Shingrix vaccines and then you are done.       Nutrition:  Eat at least 5 servings of fruits and vegetables daily.   Eat whole-grain bread, whole-wheat pasta and brown rice instead of white grains and rice.   Talk to your provider about Calcium and Vitamin D.  "       --Good Grains:  Oats, brown rice, Quinoa (these do not raise the blood sugar as much)     --Bad grains:  Anything made from wheat or white rice     (because these raise the blood sugars significantly, and the possible gluten issue from wheat for some people).      --Proteins:  Aim for \"lean proteins\" including chicken, fish, seafood, pork, turkey, and eggs (in moderation); Eat red meat only occasionally    Lifestyle  Exercise for at least 150 minutes a week (30 minutes a day, 5 days a week). This will help you control your weight and prevent disease.   Limit alcohol to one drink per day.   No smoking.   Wear sunscreen to prevent skin cancer.   See your dentist every six months for an exam and cleaning.   See your eye doctor every 1 to 2 years.      "

## 2024-05-21 ENCOUNTER — PATIENT OUTREACH (OUTPATIENT)
Dept: GASTROENTEROLOGY | Facility: CLINIC | Age: 59
End: 2024-05-21
Payer: COMMERCIAL

## 2024-05-29 ENCOUNTER — OFFICE VISIT (OUTPATIENT)
Dept: OTHER | Facility: CLINIC | Age: 59
End: 2024-05-29
Attending: INTERNAL MEDICINE
Payer: COMMERCIAL

## 2024-05-29 ENCOUNTER — TELEPHONE (OUTPATIENT)
Dept: OTHER | Facility: CLINIC | Age: 59
End: 2024-05-29

## 2024-05-29 VITALS
WEIGHT: 189.8 LBS | DIASTOLIC BLOOD PRESSURE: 68 MMHG | BODY MASS INDEX: 27.17 KG/M2 | OXYGEN SATURATION: 95 % | SYSTOLIC BLOOD PRESSURE: 121 MMHG | HEIGHT: 70 IN | HEART RATE: 71 BPM

## 2024-05-29 DIAGNOSIS — I82.401 RECURRENT ACUTE DEEP VEIN THROMBOSIS (DVT) OF RIGHT LOWER EXTREMITY (H): Primary | ICD-10-CM

## 2024-05-29 PROCEDURE — 99213 OFFICE O/P EST LOW 20 MIN: CPT | Performed by: INTERNAL MEDICINE

## 2024-05-29 PROCEDURE — G2211 COMPLEX E/M VISIT ADD ON: HCPCS | Performed by: INTERNAL MEDICINE

## 2024-05-29 PROCEDURE — 99214 OFFICE O/P EST MOD 30 MIN: CPT | Performed by: INTERNAL MEDICINE

## 2024-05-29 NOTE — TELEPHONE ENCOUNTER
Per office visit note 05/29/24:  He has a colonoscopy on 06/25/2024. He may now safely interrupt AC as he is > six months out from his most recent event. Hold Eliquis with AM dose on 06/22/24. Resume Eliquis ASAP postprocedurally as directed by his GI MD.       Relayed to [facility] via voice message.    Lauren HOLLOWAY  [facility]  Phone: [phone]  Fax: [phone]

## 2024-05-29 NOTE — PROGRESS NOTES
"Patient is here to discuss follow up    /68 (BP Location: Right arm, Patient Position: Chair, Cuff Size: Adult Regular)   Pulse 71   Ht 5' 10\" (1.778 m)   Wt 189 lb 12.8 oz (86.1 kg)   SpO2 95%   BMI 27.23 kg/m      Questions patient would like addressed today are: N/A.    Refills are needed: No    Has homecare services and agency name:  [facility]    " Your provider has ordered a radiology test for you. Please call our Pre-Service Department to schedule at your earliest convenience. Their number is [phone]. When calling Pre-Service, they'll work with you to find a date and time that works best for your test, while leaving enough time to ensure insurance authorization is in place prior to your arrival. If at any time you have questions about this process, please call Pre-Service at the number above. Thank you.

## 2024-05-29 NOTE — TELEPHONE ENCOUNTER
[facility]    Who is the name of the provider?:  NORMA QUINTANA   What is the location you see this provider at/preferred location?: [place]  Person calling / Facility: [facility] Nurse line  Phone number:  [phone]  Nurse call back needed:  Yes, confidential line, okay to leave a message.     Reason for call:  Patient has colonoscopy scheduled 6/25/24. [facility] requesting a hold on Eliquis 2 days prior to procedure 6/23/24-6/25/24. If hold cannot be approved, [facility] requesting creatinine levels for the past 90 days.    Pharmacy location: n/a  Outside Imaging: n/a   Can we leave a detailed message on this number?  YES     5/29/2024, 2:40 PM

## 2024-05-29 NOTE — PROGRESS NOTES
VASCULAR MEDICINE FOLLOW UP VISIT      A/P:        (I82.401) Recurrent acute deep vein thrombosis (DVT) of right lower extremity (H)    Comment: His first event was unprovoked. He has no Leiden mutation, ACLA , ATIII, LA based upon w/u in 2014. He went off of AC after a year. He then recently had another unprovoked event (although he had just quit smoking so one could consider this provoked in association with long standing tobacco use). He is not up to date on his cancer screening. We checked a CT C/A/P which ruled out obvious masses. We checked remaining thrombophilia labs (Antithrombin III, Beta 2 Glycoprotein 1 Antibody IgG, Beta 2 Glycoprotein 1 Antibody IgM, Protein C chromogenic, Protein S Antigen Free, Protein Immunofixation Serum), all of which were normal.     D dimer of 05/13/24 was undetectable.    RLE duplex of 05/13/2024 reveals chronic nonocclusive deep vein thrombosis in the right  popliteal vein, overall slightly improved from previous exam.    Plan: Continue to wear Compression stockings. He will require lifelong AC as this is a recurrent event. It was likely subacute as d dimer had normalized after only one week on AC. Undertake dose reduction to 2.5 mg PO BID  based upon interval data of d dimer and duplex referenced above.Undertake this on 08/20/24 when he runs out of his current, just recently filled supply of 5 mg PO BID. Check d dimer after on this lower dose one month around 08/20/24. RTC several days later to review d dimer on lower dose of apixaban.      He has a colonoscopy on 06/25/2024. He may now safely interrupt AC as he is > six months out from his most recent event. Hold Eliquis with AM dose on 06/22/24. Resume Eliquis ASAP postprocedurally as directed by his GI MD.       The longitudinal care of plan for Saran was addressed during this visit. Due to added complexity of care, we will continue to support Saran  and the subsequent management of this condition and with ongoing  continuity of care for this condition.              32 minutes total medical care on today's date       HPI: Saran Agudelo is a 58 year old male with a h/o RLE occlusive DVT involving the mid to distal femoral vein extending into the popliteal and tibioperoneal trunk in 2014 with concurrent BL PE without HD compromise for which he was ACd  with warfarin for thirteen months. He went off of AC at the direction of his hematologist around 13 months after diagnosis. He was then fine until recently.     On 11/23/23 he was seen in the [facility] ED with RLE pain. D dimer was normal. BLE venous duplex was interpreted as  acute deep vein thrombosis extending from the right distal femoral vein through the popliteal vein despite the normal d dimer. Of note, his 12/05/2017 RLE venous duplex revealed a small amount of nonocclusive chronic thrombus in the right popliteal vein, improved compared to the prior study of 4/32013. Accordingly, there had been an interval increase in thrombotic burden some time between 2017 and 2023, regardless of his currently normal d dimer.Therefore Dr. Donahue did discharge him with Eliquis 10 mg PO BID times seven days, then 5 mg PO BID thereafter.  He quit smoking on 11/23/23 after a 30 pack year tobacco use history.  He has not had any recent prolonged immobility or inactivity. No recent surgeries or prolonged trips. He has not recently had COVID. His last COVID vaccine or booster was > two months ago. He is not up to date on all age appropriate cancer screening. His PSA is supposed to be drawn prior to February, and his last colonoscopy was in 2015 and revealed polyps. I can not access the surgical pathology however. No respiratory compromise suggestive of PE.         Review Of Systems  Skin: negative  Eyes: negative  Ears/Nose/Throat: negative  Respiratory: No shortness of breath, dyspnea on exertion, cough, or hemoptysis  Cardiovascular: negative  Gastrointestinal: negative  Genitourinary:  negative  Musculoskeletal: negative  Neurologic: negative  Psychiatric: negative  Hematologic/Lymphatic/Immunologic: negative  Endocrine: negative        PAST MEDICAL HISTORY:                  Past Medical History           Past Medical History:   Diagnosis Date    Hyperlipidemia LDL goal <160 04/11/2013    Hypothyroidism, unspecified 01/26/2023     Free T4 0.84, elevated TSH    Nephrolithiasis 04/23/2013            PAST SURGICAL HISTORY:                  Past Surgical History   No past surgical history on file.         CURRENT MEDICATIONS:                  Current Outpatient Prescriptions               Current Outpatient Medications   Medication Sig Dispense Refill    amLODIPine (NORVASC) 2.5 MG tablet Take 1 tablet by mouth daily        [START ON 12/23/2023] apixaban ANTICOAGULANT (ELIQUIS) 5 MG tablet Take 1 tablet (5 mg) by mouth 2 times daily 60 tablet 11    Apixaban Starter Pack (ELIQUIS DVT/PE STARTER PACK) 5 MG TBPK Take 10 mg by mouth 2 times daily for 7 days, THEN 5 mg 2 times daily for 23 days. 74 each 0    hydrochlorothiazide (HYDRODIURIL) 12.5 MG tablet Take 1 tablet by mouth daily        levothyroxine (SYNTHROID/LEVOTHROID) 112 MCG tablet Take 1 tablet (112 mcg) by mouth daily 90 tablet 3    losartan (COZAAR) 25 MG tablet Take 1 tablet by mouth daily        reason aspirin not prescribed, intentional, Taking Eliquis anticoagulation                ALLERGIES:                          Allergies   Allergen Reactions    Bees Anaphylaxis    Compazine [Prochlorperazine]      Droperidol      Lisinopril Cough    Penicillins Hives         SOCIAL HISTORY:                  Social History   Social History                Socioeconomic History    Marital status: Single       Spouse name: Not on file    Number of children: Not on file    Years of education: Not on file    Highest education level: Not on file   Occupational History    Not on file   Tobacco Use    Smoking status: Former       Packs/day: .5       Types:  Cigarettes       Start date: 10/20/1983       Quit date: 11/28/2023    Smokeless tobacco: Never    Tobacco comments:       4 days smoke free as of 11-28-23   Vaping Use    Vaping Use: Never used   Substance and Sexual Activity    Alcohol use: Not Currently       Alcohol/week: 0.0 standard drinks of alcohol       Comment: occasionally    Drug use: No    Sexual activity: Never   Other Topics Concern    Parent/sibling w/ CABG, MI or angioplasty before 65F 55M? Not Asked   Social History Narrative    Not on file      Social Determinants of Health              Financial Resource Strain: Low Risk  (11/28/2023)     Financial Resource Strain      Within the past 12 months, have you or your family members you live with been unable to get utilities (heat, electricity) when it was really needed?: No   Food Insecurity: Low Risk  (11/28/2023)     Food Insecurity      Within the past 12 months, did you worry that your food would run out before you got money to buy more?: No      Within the past 12 months, did the food you bought just not last and you didn t have money to get more?: No   Transportation Needs: Low Risk  (11/28/2023)     Transportation Needs      Within the past 12 months, has lack of transportation kept you from medical appointments, getting your medicines, non-medical meetings or appointments, work, or from getting things that you need?: No   Physical Activity: Not on file   Stress: Not on file   Social Connections: Not on file   Interpersonal Safety: Low Risk  (11/28/2023)     Interpersonal Safety      Do you feel physically and emotionally safe where you currently live?: Yes      Within the past 12 months, have you been hit, slapped, kicked or otherwise physically hurt by someone?: No      Within the past 12 months, have you been humiliated or emotionally abused in other ways by your partner or ex-partner?: No   Housing Stability: Low Risk  (11/28/2023)     Housing Stability      Do you have housing? : Yes       Are you worried about losing your housing?: No            FAMILY HISTORY:                   Family History             Family History   Problem Relation Age of Onset    Hypothyroidism Mother      C.A.D. Father      Hypertension Father      No Known Problems Sister      No Known Problems Sister      No Known Problems Brother                 Physical exam Reveals:     O/P: WNL  HEENT: WNL  NECK: No JVD, thyromegaly, or lymphadenopathy  HEART: RRR, no murmurs, gallops, or rubs  LUNGS: CTA bilaterally without rales, wheezes, or rhonchi  GI: NABS, nondistended, nontender, soft  EXT:without cyanosis, clubbing, or edema  NEURO: nonfocal  : no flank tenderness                       Component      Latest Ref Rng 11/29/2023  10:21 AM   D-Dimer Quantitative      0.00 - 0.50 ug/mL FEU <0.27    HIV Antigen Antibody Combo      Nonreactive  Nonreactive    Hepatitis C Antibody      Nonreactive  Nonreactive    Hemoglobin      13.3 - 17.7 g/dL 14.6         Component      Latest Ref Rng 4/5/2014  5:15 PM   Cardiolipin IgG Lary      0 - 15.0 GPL <15.0     Cardiolipin IgM Lary      0 - 12.5 MPL <12.5     Lupus Result      NEG  Negative                EXAM: US LOWER EXTREMITY VENOUS DUPLEX BILATERAL  LOCATION: [facility]  DATE: 11/23/2023     INDICATION: pain, dvt  COMPARISON: 12/05/2017  TECHNIQUE: Venous Duplex ultrasound of bilateral lower extremities with and without compression, augmentation and duplex. Color flow and spectral Doppler with waveform analysis performed.     FINDINGS: Exam includes the common femoral, femoral, popliteal veins as well as segmentally visualized deep calf veins and greater saphenous vein.      RIGHT: Nonocclusive deep vein thrombosis extending from the distal femoral vein through the popliteal vein. While the patient has had a deep vein thrombosis in this location, the current thrombus is central and either acute or acute on chronic. The   remaining deep venous structures are  patent. No superficial thrombophlebitis. No popliteal cyst.     LEFT: No deep vein thrombosis. No superficial thrombophlebitis. No popliteal cyst.                                                                      IMPRESSION:  1.  Acute deep vein thrombosis extending from the right distal femoral vein through the popliteal vein.           VENOUS ULTRASOUND RIGHT LEG  12/5/2017 11:15 AM      HISTORY:  Pain of right lower extremity.     COMPARISON: 4/3/2014.     FINDINGS:  Examination of the deep veins with graded compression and  color flow Doppler with spectral wave form analysis shows no evidence  of thrombus in the common femoral vein, femoral vein, or calf veins.  Minimal nonocclusive chronic thrombus is noted in the popliteal vein,  improved when compared to the prior study. No evidence of Baker's  cyst.                                                                      IMPRESSION: Small amount of nonocclusive chronic thrombus in the right  popliteal vein, improved compared to the prior study.       JOSE ALEJANDRO ROBERTSON, DO        CT CHEST AND PULMONARY EMBOLISM ANGIOGRAM  6/8/2015 8:19 AM      HISTORY: History of pulmonary embolus. Previous scan had revealed some  infarct and effusion. Follow it as patient wants to discontinue  anticoagulation, other pulmonary embolism and infarction.     COMPARISON: 4/5/2012.     TECHNIQUE: Volumetric helical acquisition of CT images of the chest  from the lung apices to the kidneys were acquired after the  administration of 75 mL Isovue-370  IV contrast.      FINDINGS: There is no pulmonary embolism. The previously seen  pulmonary emboli have resolved. Stable granulomatous changes. Lungs  are clear of infiltrate. Previously seen infiltrate versus infarct no  longer present. The heart is not enlarged.  Thoracic aorta is  unremarkable without evidence of dissection or aneurysm. There is no  pleural or pericardial effusion.  There is no pneumothorax. Adrenal  glands are normal.   Remainder of the visualized upper abdomen is  unremarkable.     IMPRESSION  IMPRESSION:   1. No pulmonary embolism demonstrated. Previously seen pulmonary  emboli have resolved.  2. No thoracic aortic dissection or aneurysm.     BROCK CASH MD           CT CHEST PULMONARY EMBOLISM WITH CONTRAST  4/5/2014 12:56 PM    HISTORY:  Known DVT right leg.  Evaluate for pulmonary embolism.    TECHNIQUE:  Thin section axial images are performed from the thoracic  inlet to the lung bases utilizing 86 mL of Isovue 370 IV contrast  without adverse event.  Coronal reformatted images are also generated.    FINDINGS:    Chest:  Atelectatic lung base changes are noted bilaterally.  Calcified granulomas noted at the posterior left lung base.  Subpleural infiltrate or consolidation is present in the anterior  right middle lobe on image 93.  Trace right pleural fluid collection  is present.  No left pleural fluid or pericardial fluid.  Heart is  normal in size.  No enlarged lymph nodes.  Calcified left hilar lymph  nodes are present consistent with old granulomatous disease.  Right  lower lobe pulmonary emboli are present.  Left upper lobe embolism is  also noted in a peripheral pulmonary artery.  Proximal thrombus is  also noted in the right middle lobe pulmonary artery.  Thoracic aorta  is unremarkable.  Limited images upper abdomen are unremarkable.  Bone  window examination is within normal limits.    Impression   IMPRESSION:  1.  Pulmonary embolism involving the right upper lobe, right middle  lobe and right lower lobe in addition to a peripheral left upper lobe  pulmonary artery.  Area of subpleural consolidation anterior right  middle lobe may be a small pulmonary infarct.  Trace right pleural  effusion is also noted.  2.  Evidence of old granulomatous disease.    Dr. Gerard was contacted by me on 4/5/2014 at 1:15 PM regarding the  abnormal finding of pulmonary embolism and verbalized understanding of  the abnormal  finding.    [Critical result: See report]    SALOMÓN GARCIA MD               ULTRASOUND RIGHT LOWER EXTREMITY VENOUS  4/3/2014 1:40 PM    HISTORY: Right calf pain and swelling.    COMPARISON: None.    TECHNIQUE: Grayscale and color Doppler sonographic imaging of the  right lower extremity veins was performed with spectral waveform  analysis.    FINDINGS: The common femoral, proximal greater saphenous and  proximal-mid femoral vein are normally compressible with normal flow.  In the mid to distal femoral vein, echogenic intraluminal thrombus is  noted, and the vein becomes noncompressible, with no detectable flow.  This occlusive thrombus continues through the popliteal vein and  tibioperoneal trunk. The posterior tibial and peroneal veins however  appear patent with color flow. Thrombus is also seen in the  gastrocnemius veins.    Impression   IMPRESSION: Occlusive deep venous thrombosis involving the mid to  distal femoral vein extending into the popliteal and tibioperoneal  trunk.    Findings were discussed with Dr. Hollis by telephone by Dr. Chun at  2 PM on April 3, 2014.    YURI CHUN MD       CT CHEST/ABDOMEN/PELVIS W CONTRAST 12/12/2023 7:10 AM     CLINICAL HISTORY: Recurrent acute deep vein thrombosis (DVT) of right  lower extremity (H)     TECHNIQUE: CT scan of the chest, abdomen, and pelvis was performed  following injection of IV contrast. Multiplanar reformats were  obtained. Dose reduction techniques were used.   CONTRAST: 92mL Isovue-370     COMPARISON: Chest CT 6/8/2015, CT abdomen and pelvis 11/7/2014     FINDINGS:   LUNGS AND PLEURA: Calcified granuloma left lower lobe. Lungs otherwise  clear. No pleural effusion.     MEDIASTINUM/AXILLAE: No lymphadenopathy. Mild coronary artery  calcification.     HEPATOBILIARY: Normal.     PANCREAS: Normal.     SPLEEN: Normal.     ADRENAL GLANDS: Normal.     KIDNEYS/BLADDER: Small renal cysts. Otherwise normal.     BOWEL: Normal.     PELVIC ORGANS: Normal.      ADDITIONAL FINDINGS: No compression of the IVC or iliac veins.     MUSCULOSKELETAL: Normal.                                                                      IMPRESSION:  1.  Negative CT of the chest, abdomen, and pelvis. No evidence of  malignancy.     ADILIA BEAN MD

## 2024-06-25 ENCOUNTER — TRANSFERRED RECORDS (OUTPATIENT)
Dept: HEALTH INFORMATION MANAGEMENT | Facility: CLINIC | Age: 59
End: 2024-06-25
Payer: COMMERCIAL

## 2024-08-26 ENCOUNTER — DOCUMENTATION ONLY (OUTPATIENT)
Dept: ANTICOAGULATION | Facility: CLINIC | Age: 59
End: 2024-08-26
Payer: COMMERCIAL

## 2024-08-26 NOTE — PROGRESS NOTES
Anticoagulant Therapeutic Duplication    Duplicate orders identified: same medication but different dose, form, frequency or route    The duplicate anticoagulant order(s) has been discontinued    Active anticoagulant: apixaban (Eliquis)    Plan made per [facility] anticoagulation protocol.    Miles Soria RN  8/26/2024

## 2024-09-18 ENCOUNTER — TELEPHONE (OUTPATIENT)
Dept: OTHER | Facility: CLINIC | Age: 59
End: 2024-09-18

## 2024-09-18 ENCOUNTER — LAB (OUTPATIENT)
Dept: LAB | Facility: CLINIC | Age: 59
End: 2024-09-18
Payer: COMMERCIAL

## 2024-09-18 DIAGNOSIS — I82.401 RECURRENT ACUTE DEEP VEIN THROMBOSIS (DVT) OF RIGHT LOWER EXTREMITY (H): ICD-10-CM

## 2024-09-18 LAB — D DIMER PPP FEU-MCNC: 0.34 UG/ML FEU (ref 0–0.5)

## 2024-09-18 PROCEDURE — 36415 COLL VENOUS BLD VENIPUNCTURE: CPT

## 2024-09-18 PROCEDURE — 85379 FIBRIN DEGRADATION QUANT: CPT

## 2024-09-18 NOTE — TELEPHONE ENCOUNTER
[facility]    Who is the name of the provider?:  NORMA QUINTANA   What is the location you see this provider at/preferred location?: [place]  Person calling / Facility: Saran Agudelo  Phone number:  [phone] (home)   Nurse call back needed:  yes     Reason for call:  Patient is scheduled for a Lab appointment today.Patient is on Eliquis. He will out of the medication on 9-23-24. He has an appointment with Doctor Koko on 9-26-24.He will be out of the medication on 9-23-24.Should he refill the medication before he comes for his appointment? Or should he wait? He stated that it is expensive.    Pharmacy location:  [id number] - [facility] [address]  Outside Imaging: n/a   Can we leave a detailed message on this number?  YES     9/18/2024, 8:29 AM

## 2024-09-18 NOTE — TELEPHONE ENCOUNTER
Called patient and discussed the dose of Eliquis will be dependant on his d-dimer results. He states Eliquis is expensive and would prefer to not refill the 5mg if Dr. Sutherland will be changing his dose to 2.5 mg- I discussed I will watch for  the results tomorrow AM and discuss with Dr. Sutherland before Saran is out of his medication.    Mercedes HOLLOWAY, RN    [facility]  Office: [phone]  Fax: [phone]

## 2024-09-20 NOTE — TELEPHONE ENCOUNTER
Verbal order from Dr. Sutherland patient is supposed to start Eliquis 2.5 mg BID.  Called Saran and discussed RX for Eliquis 2.5 mg was sent on 5/29/24 and to call his pharmacy to have them fill. He will call me back if they do not have the RX.       Mercedes HOLLOWAY, RN    [facility]  Office: [phone]  Fax: [phone]

## 2024-09-20 NOTE — TELEPHONE ENCOUNTER
[facility]    Who is the name of the provider?:  NORMA QUINTANA   What is the location you see this provider at/preferred location?: [place]  Person calling / Facility: Saran BALES Jammie  Phone number:  [phone] (home)   Nurse call back needed:  Yes     Reason for call:  Patient called stating he was supposed to receive a call yesterday regarding lab results but never heard from anybody. Patient also states he will be out of Eloquis this Sunday (9/22/24) - patient would like a call back from the nurse regarding his lab results and Eloquis prescription      9/20/2024, 8:39 AM

## 2024-09-20 NOTE — TELEPHONE ENCOUNTER
Patient is out of Eliquis and needs to know if dose is changing before purchasing another RX for his current 5 mg dose- he states its very expensive and would prefer to get new dose if it is switching to 2.5 mg.     Mercedes HOLLOWAY, RN    [facility]  Office: [phone]  Fax: [phone]

## 2024-09-26 ENCOUNTER — OFFICE VISIT (OUTPATIENT)
Dept: OTHER | Facility: CLINIC | Age: 59
End: 2024-09-26
Attending: INTERNAL MEDICINE
Payer: COMMERCIAL

## 2024-09-26 VITALS
OXYGEN SATURATION: 97 % | DIASTOLIC BLOOD PRESSURE: 76 MMHG | WEIGHT: 195.8 LBS | HEART RATE: 74 BPM | SYSTOLIC BLOOD PRESSURE: 129 MMHG | BODY MASS INDEX: 28.09 KG/M2

## 2024-09-26 DIAGNOSIS — I82.401 RECURRENT ACUTE DEEP VEIN THROMBOSIS (DVT) OF RIGHT LOWER EXTREMITY (H): ICD-10-CM

## 2024-09-26 PROCEDURE — 99214 OFFICE O/P EST MOD 30 MIN: CPT | Performed by: INTERNAL MEDICINE

## 2024-09-26 PROCEDURE — G2211 COMPLEX E/M VISIT ADD ON: HCPCS | Performed by: INTERNAL MEDICINE

## 2024-09-26 PROCEDURE — 99213 OFFICE O/P EST LOW 20 MIN: CPT | Performed by: INTERNAL MEDICINE

## 2024-09-26 NOTE — PROGRESS NOTES
[facility]        Patient is here for a  follow up.    Pt is currently taking Eliquis.    /76 (BP Location: Right arm, Patient Position: Chair, Cuff Size: Adult Regular)   Pulse 74   Wt 195 lb 12.8 oz (88.8 kg)   SpO2 97%   BMI 28.09 kg/m      The provider has been notified that the patient has no concerns.     Questions patient would like addressed today are: N/A.    Refills are needed: N/A    Has homecare services and agency name:  Pricilla Saleh MA

## 2025-05-14 DIAGNOSIS — E03.9 HYPOTHYROIDISM, UNSPECIFIED TYPE: ICD-10-CM

## 2025-05-14 RX ORDER — LEVOTHYROXINE SODIUM 112 UG/1
112 TABLET ORAL DAILY
Qty: 90 TABLET | Refills: 0 | Status: SHIPPED | OUTPATIENT
Start: 2025-05-14

## 2025-06-11 DIAGNOSIS — I10 ESSENTIAL HYPERTENSION: ICD-10-CM

## 2025-06-11 RX ORDER — HYDROCHLOROTHIAZIDE 12.5 MG/1
12.5 TABLET ORAL DAILY
Qty: 90 TABLET | Refills: 0 | Status: SHIPPED | OUTPATIENT
Start: 2025-06-11

## 2025-06-11 NOTE — TELEPHONE ENCOUNTER
Received a call from the patient stating he is needing a refill of his Hydrochlorothiazide. Patient states he has 5 days left of the medication. RN noticed request was received from [facility]. RN advised [facility] as been having issues with receiving medication scripts. Patient states he actually spoke to [facility] and they informed patient that their location has not been having issues.     RN advised refill request will be sent to PCP for approval. Patient expressed understanding and had no additional questions at this time.     Mehreen Woodward RN

## 2025-06-11 NOTE — TELEPHONE ENCOUNTER
Prescription(s) sent electronically to specified pharmacy.  They confirmed receipt of the e prescription

## 2025-06-14 DIAGNOSIS — E03.9 HYPOTHYROIDISM, UNSPECIFIED TYPE: ICD-10-CM

## 2025-06-14 DIAGNOSIS — E78.5 HYPERLIPIDEMIA LDL GOAL <160: ICD-10-CM

## 2025-06-14 DIAGNOSIS — I10 BENIGN ESSENTIAL HYPERTENSION: Primary | ICD-10-CM

## 2025-06-14 DIAGNOSIS — N18.31 STAGE 3A CHRONIC KIDNEY DISEASE (H): ICD-10-CM

## 2025-06-14 DIAGNOSIS — Z12.5 SCREENING FOR PROSTATE CANCER: ICD-10-CM

## 2025-06-16 RX ORDER — AMLODIPINE BESYLATE 2.5 MG/1
2.5 TABLET ORAL DAILY
Qty: 90 TABLET | Refills: 0 | Status: SHIPPED | OUTPATIENT
Start: 2025-06-16

## 2025-06-16 RX ORDER — LOSARTAN POTASSIUM 25 MG/1
25 TABLET ORAL DAILY
Qty: 90 TABLET | Refills: 0 | Status: SHIPPED | OUTPATIENT
Start: 2025-06-16

## 2025-08-14 DIAGNOSIS — E03.9 HYPOTHYROIDISM, UNSPECIFIED TYPE: ICD-10-CM

## 2025-08-14 RX ORDER — LEVOTHYROXINE SODIUM 112 UG/1
112 TABLET ORAL DAILY
Qty: 90 TABLET | Refills: 0 | Status: SHIPPED | OUTPATIENT
Start: 2025-08-14

## 2025-08-15 DIAGNOSIS — I10 ESSENTIAL HYPERTENSION: ICD-10-CM

## 2025-08-15 DIAGNOSIS — I10 BENIGN ESSENTIAL HYPERTENSION: ICD-10-CM

## 2025-08-15 DIAGNOSIS — N18.31 STAGE 3A CHRONIC KIDNEY DISEASE (H): ICD-10-CM

## 2025-08-19 RX ORDER — HYDROCHLOROTHIAZIDE 12.5 MG/1
12.5 TABLET ORAL DAILY
Qty: 90 TABLET | Refills: 0 | Status: SHIPPED | OUTPATIENT
Start: 2025-08-19

## 2025-08-19 RX ORDER — AMLODIPINE BESYLATE 2.5 MG/1
2.5 TABLET ORAL DAILY
Qty: 90 TABLET | Refills: 0 | Status: SHIPPED | OUTPATIENT
Start: 2025-08-19

## 2025-08-19 RX ORDER — LOSARTAN POTASSIUM 25 MG/1
25 TABLET ORAL DAILY
Qty: 90 TABLET | Refills: 0 | Status: SHIPPED | OUTPATIENT
Start: 2025-08-19